# Patient Record
Sex: FEMALE | Race: OTHER
[De-identification: names, ages, dates, MRNs, and addresses within clinical notes are randomized per-mention and may not be internally consistent; named-entity substitution may affect disease eponyms.]

---

## 2018-06-02 ENCOUNTER — HOSPITAL ENCOUNTER (INPATIENT)
Dept: HOSPITAL 17 - PHED | Age: 53
LOS: 4 days | Discharge: HOME | DRG: 65 | End: 2018-06-06
Attending: HOSPITALIST | Admitting: HOSPITALIST
Payer: SELF-PAY

## 2018-06-02 VITALS
DIASTOLIC BLOOD PRESSURE: 76 MMHG | HEART RATE: 68 BPM | OXYGEN SATURATION: 98 % | TEMPERATURE: 98.7 F | SYSTOLIC BLOOD PRESSURE: 152 MMHG | RESPIRATION RATE: 16 BRPM

## 2018-06-02 VITALS
HEART RATE: 71 BPM | RESPIRATION RATE: 16 BRPM | OXYGEN SATURATION: 100 % | SYSTOLIC BLOOD PRESSURE: 149 MMHG | DIASTOLIC BLOOD PRESSURE: 88 MMHG

## 2018-06-02 VITALS
RESPIRATION RATE: 16 BRPM | SYSTOLIC BLOOD PRESSURE: 162 MMHG | OXYGEN SATURATION: 98 % | HEART RATE: 82 BPM | DIASTOLIC BLOOD PRESSURE: 87 MMHG

## 2018-06-02 VITALS — BODY MASS INDEX: 19.04 KG/M2 | HEIGHT: 60 IN | WEIGHT: 97 LBS

## 2018-06-02 VITALS — DIASTOLIC BLOOD PRESSURE: 74 MMHG | HEART RATE: 71 BPM | SYSTOLIC BLOOD PRESSURE: 140 MMHG

## 2018-06-02 VITALS — HEART RATE: 60 BPM

## 2018-06-02 VITALS
TEMPERATURE: 99 F | HEART RATE: 68 BPM | DIASTOLIC BLOOD PRESSURE: 80 MMHG | RESPIRATION RATE: 20 BRPM | OXYGEN SATURATION: 100 % | SYSTOLIC BLOOD PRESSURE: 151 MMHG

## 2018-06-02 VITALS
HEART RATE: 72 BPM | OXYGEN SATURATION: 97 % | RESPIRATION RATE: 16 BRPM | DIASTOLIC BLOOD PRESSURE: 79 MMHG | SYSTOLIC BLOOD PRESSURE: 127 MMHG

## 2018-06-02 VITALS — OXYGEN SATURATION: 98 %

## 2018-06-02 DIAGNOSIS — I10: ICD-10-CM

## 2018-06-02 DIAGNOSIS — I61.8: Primary | ICD-10-CM

## 2018-06-02 DIAGNOSIS — R29.810: ICD-10-CM

## 2018-06-02 DIAGNOSIS — G93.89: ICD-10-CM

## 2018-06-02 DIAGNOSIS — D50.9: ICD-10-CM

## 2018-06-02 DIAGNOSIS — Z85.3: ICD-10-CM

## 2018-06-02 DIAGNOSIS — G81.94: ICD-10-CM

## 2018-06-02 DIAGNOSIS — K21.9: ICD-10-CM

## 2018-06-02 DIAGNOSIS — Z86.73: ICD-10-CM

## 2018-06-02 DIAGNOSIS — E78.5: ICD-10-CM

## 2018-06-02 LAB
ALBUMIN SERPL-MCNC: 4.5 GM/DL (ref 3.4–5)
ALP SERPL-CCNC: 65 U/L (ref 45–117)
ALT SERPL-CCNC: 46 U/L (ref 10–53)
AMORPHOUS SEDIMENT, URINE: (no result)
AST SERPL-CCNC: 45 U/L (ref 15–37)
BASOPHILS # BLD AUTO: 0.1 TH/MM3 (ref 0–0.2)
BASOPHILS NFR BLD: 0.9 % (ref 0–2)
BILIRUB SERPL-MCNC: 0.5 MG/DL (ref 0.2–1)
BUN SERPL-MCNC: 13 MG/DL (ref 7–18)
CALCIUM SERPL-MCNC: 9.1 MG/DL (ref 8.5–10.1)
CHLORIDE SERPL-SCNC: 105 MEQ/L (ref 98–107)
COLOR UR: (no result)
CREAT SERPL-MCNC: 0.91 MG/DL (ref 0.5–1)
EOSINOPHIL # BLD: 0.1 TH/MM3 (ref 0–0.4)
EOSINOPHIL NFR BLD: 1 % (ref 0–4)
ERYTHROCYTE [DISTWIDTH] IN BLOOD BY AUTOMATED COUNT: 15.3 % (ref 11.6–17.2)
FIBRINOGEN PPP-MCNC: 242 MG/DL (ref 227–377)
GFR SERPLBLD BASED ON 1.73 SQ M-ARVRAT: 64 ML/MIN (ref 89–?)
GLUCOSE SERPL-MCNC: 125 MG/DL (ref 74–106)
GLUCOSE UR STRIP-MCNC: (no result) MG/DL
HCO3 BLD-SCNC: 27.1 MEQ/L (ref 21–32)
HCT VFR BLD CALC: 38.4 % (ref 35–46)
HGB BLD-MCNC: 11.8 GM/DL (ref 11.6–15.3)
HGB UR QL STRIP: (no result)
INR PPP: 1 RATIO
KETONES UR STRIP-MCNC: (no result) MG/DL
LYMPHOCYTES # BLD AUTO: 2.6 TH/MM3 (ref 1–4.8)
LYMPHOCYTES NFR BLD AUTO: 36.5 % (ref 9–44)
MAGNESIUM SERPL-MCNC: 2.5 MG/DL (ref 1.5–2.5)
MCH RBC QN AUTO: 20.3 PG (ref 27–34)
MCHC RBC AUTO-ENTMCNC: 30.9 % (ref 32–36)
MCV RBC AUTO: 65.7 FL (ref 80–100)
MONOCYTE #: 0.3 TH/MM3 (ref 0–0.9)
MONOCYTES NFR BLD: 4.6 % (ref 0–8)
NEUTROPHILS # BLD AUTO: 4.1 TH/MM3 (ref 1.8–7.7)
NEUTROPHILS NFR BLD AUTO: 57 % (ref 16–70)
NITRITE UR QL STRIP: (no result)
PHOSPHATE SERPL-MCNC: 3.6 MG/DL (ref 2.5–4.9)
PLATELET # BLD: 282 TH/MM3 (ref 150–450)
PMV BLD AUTO: 10 FL (ref 7–11)
PROT SERPL-MCNC: 9 GM/DL (ref 6.4–8.2)
PROTHROMBIN TIME: 10.3 SEC (ref 9.8–11.6)
RBC # BLD AUTO: 5.84 MIL/MM3 (ref 4–5.3)
SODIUM SERPL-SCNC: 139 MEQ/L (ref 136–145)
SP GR UR STRIP: (no result) (ref 1–1.03)
SQUAMOUS #/AREA URNS HPF: (no result) /HPF (ref 0–5)
TROPONIN I SERPL-MCNC: (no result) NG/ML (ref 0.02–0.05)
URINE LEUKOCYTE ESTERASE: (no result)
WBC # BLD AUTO: 7.2 TH/MM3 (ref 4–11)

## 2018-06-02 PROCEDURE — 85730 THROMBOPLASTIN TIME PARTIAL: CPT

## 2018-06-02 PROCEDURE — 85384 FIBRINOGEN ACTIVITY: CPT

## 2018-06-02 PROCEDURE — 84100 ASSAY OF PHOSPHORUS: CPT

## 2018-06-02 PROCEDURE — 86900 BLOOD TYPING SEROLOGIC ABO: CPT

## 2018-06-02 PROCEDURE — 71045 X-RAY EXAM CHEST 1 VIEW: CPT

## 2018-06-02 PROCEDURE — 83550 IRON BINDING TEST: CPT

## 2018-06-02 PROCEDURE — 86922 COMPATIBILITY TEST ANTIGLOB: CPT

## 2018-06-02 PROCEDURE — 84443 ASSAY THYROID STIM HORMONE: CPT

## 2018-06-02 PROCEDURE — 86870 RBC ANTIBODY IDENTIFICATION: CPT

## 2018-06-02 PROCEDURE — 70498 CT ANGIOGRAPHY NECK: CPT

## 2018-06-02 PROCEDURE — 82728 ASSAY OF FERRITIN: CPT

## 2018-06-02 PROCEDURE — 85044 MANUAL RETICULOCYTE COUNT: CPT

## 2018-06-02 PROCEDURE — 87641 MR-STAPH DNA AMP PROBE: CPT

## 2018-06-02 PROCEDURE — 83540 ASSAY OF IRON: CPT

## 2018-06-02 PROCEDURE — 70496 CT ANGIOGRAPHY HEAD: CPT

## 2018-06-02 PROCEDURE — 82948 REAGENT STRIP/BLOOD GLUCOSE: CPT

## 2018-06-02 PROCEDURE — 86077 PHYS BLOOD BANK SERV XMATCH: CPT

## 2018-06-02 PROCEDURE — 99291 CRITICAL CARE FIRST HOUR: CPT

## 2018-06-02 PROCEDURE — 82550 ASSAY OF CK (CPK): CPT

## 2018-06-02 PROCEDURE — 80053 COMPREHEN METABOLIC PANEL: CPT

## 2018-06-02 PROCEDURE — 70450 CT HEAD/BRAIN W/O DYE: CPT

## 2018-06-02 PROCEDURE — 86920 COMPATIBILITY TEST SPIN: CPT

## 2018-06-02 PROCEDURE — 84466 ASSAY OF TRANSFERRIN: CPT

## 2018-06-02 PROCEDURE — 80307 DRUG TEST PRSMV CHEM ANLYZR: CPT

## 2018-06-02 PROCEDURE — 86850 RBC ANTIBODY SCREEN: CPT

## 2018-06-02 PROCEDURE — 93005 ELECTROCARDIOGRAM TRACING: CPT

## 2018-06-02 PROCEDURE — 81001 URINALYSIS AUTO W/SCOPE: CPT

## 2018-06-02 PROCEDURE — 85025 COMPLETE CBC W/AUTO DIFF WBC: CPT

## 2018-06-02 PROCEDURE — 85610 PROTHROMBIN TIME: CPT

## 2018-06-02 PROCEDURE — 83735 ASSAY OF MAGNESIUM: CPT

## 2018-06-02 PROCEDURE — 94150 VITAL CAPACITY TEST: CPT

## 2018-06-02 PROCEDURE — 84484 ASSAY OF TROPONIN QUANT: CPT

## 2018-06-02 PROCEDURE — 86901 BLOOD TYPING SEROLOGIC RH(D): CPT

## 2018-06-02 RX ADMIN — Medication SCH ML: at 20:10

## 2018-06-02 RX ADMIN — STANDARDIZED SENNA CONCENTRATE AND DOCUSATE SODIUM SCH TAB: 8.6; 5 TABLET, FILM COATED ORAL at 20:10

## 2018-06-02 RX ADMIN — LEVETIRACETAM SCH MLS/HR: 10 INJECTION INTRAVENOUS at 20:10

## 2018-06-02 RX ADMIN — ATORVASTATIN CALCIUM SCH MG: 40 TABLET, FILM COATED ORAL at 20:10

## 2018-06-02 RX ADMIN — PHENYTOIN SODIUM SCH MLS/HR: 50 INJECTION INTRAMUSCULAR; INTRAVENOUS at 16:20

## 2018-06-02 NOTE — HHI.HP
[facility]


Service


Critical Care Medicine


Primary Care Physician


Unknown


Admission Diagnosis





Intracranial bleed


Diagnosis:  


Travel History


International Travel<30 Days:  No


Contact w/Intl Traveler <30 Da:  No


Traveled to Known Affected Are:  No


History of Present Illness


53-year-old female presents with a new onset of left-sided weakness and 

numbness.  She has no other concurrent complaints at this time.  She has had 2 

prior strokes but is not on any blood thinners because she has had prior 

bleeding in the brain.  She denies any modifying factors.  CT head obtained in 

the emergency department showed acute right thalamic bleed with no measurable 

midline shift.





Review of Systems


Constitutional:  DENIES: Diaphoretic episodes, Fatigue, Fever, Weight gain, 

Weight loss, Chills, Dizziness, Change in appetite, Night Sweats


Endocrine:  DENIES: Abnorml menstrual pattern, Heat/cold intolerance, Polydipsia

, Polyuria, Polyphagia


Eyes:  DENIES: Blurred vision, Diplopia, Eye inflammation, Eye pain, Vision loss

, Photosensitivity, Double Vision


Ears, nose, mouth, throat:  DENIES: Tinnitus, Hearing loss, Vertigo, Nasal 

discharge, Oral lesions, Throat pain, Hoarseness, Ear Pain, Running Nose, 

Epistaxis, Sinus Pain, Toothache, Odynophagia


Respiratory:  DENIES: Apneas, Cough, Snoring, Wheezing, Hemoptysis, Sputum 

production, Shortness of breath


Cardiovascular:  DENIES: Chest pain, Palpitations, Syncope, Dyspnea on Exertion

, PND, Lower Extremity Edema, Orthopnea, Claudication


Gastrointestinal:  DENIES: Abdominal pain, Black stools, Bloody stools, 

Constipation, Diarrhea, Nausea, Vomiting, Difficulty Swallowing, Anorexia


Genitourinary:  DENIES: Abnormal vaginal bleeding, Dysmenorrhea, Dyspareunia, 

Sexual dysfunction, Urinary frequency, Urinary incontinence, Urgency, Hematuria

, Dysuria, Nocturia, Vaginal discharge


Musculoskeletal:  DENIES: Joint pain, Muscle aches, Stiffness, Joint Swelling, 

Back pain, Neck pain


Integumentary:  DENIES: Abnormal pigmentation, Pruritus, Rash, Nail changes, 

Breast masses, Breast skin changes, Nipple discharge


Hematologic/lymphatic:  DENIES: Bruising, Lymphadenopathy


Immunologic/allergic:  DENIES: Eczema, Urticaria


Neurologic:  COMPLAINS OF: Abnormal gait, Localized weakness, Poor Balance, 

DENIES: Headache, Paresthesias, Seizures, Speech Problems, Tremor


Psychiatric:  DENIES: Anxiety, Confusion, Mood changes, Depression, 

Hallucinations, Agitation, Suicidal Ideation, Homicidal Ideation, Delusions





Past Family Social History


Allergies:  


Coded Allergies:  


     No Known Allergies (Unverified , 18)


Past Medical History


Breast cancer in remission 


Hypertension  


GERD


Cerebral aneurysm 


High cholesterol


Past Surgical History


Craniotomy 


Left breast lumpectomy.


Reported Medications





Reported Meds & Active Scripts


Active


Reported


Zantac (Ranitidine HCl) 150 Mg Tab 150 Mg PO DAILY


Lisinopril 10 Mg Tab 10 Mg PO DAILY


Lipitor (Atorvastatin Calcium) 40 Mg Tab 40 Mg PO HS


Active Ordered Medications





Current Medications








 Medications


  (Trade)  Dose


 Ordered  Sig/Tyson


 Route


 PRN Reason  Start Time


 Stop Time Status Last Admin


Dose Admin


 


 Sodium Chloride  1,000 ml @ 


 84 mls/hr  [id number]


 IV


   18 15:54


    18 16:20


 


 


 Sodium Chloride


  (NS Flush)  2 ml  UNSCH  PRN


 IV FLUSH


 FLUSH AFTER USING IV ACCESS  18 16:00


     


 


 


 Sodium Chloride


  (NS Flush)  2 ml  BID


 IV FLUSH


   18 21:00


    18 20:10


 


 


 Acetaminophen


  (Tylenol)  650 mg  Q6H  PRN


 PO


 FEVER >101F  18 16:00


     


 


 


 Acetaminophen/


 Hydrocodone Bitart


  (Norco  5-325 Mg)  1 tab  Q4H  PRN


 PO


 PAIN SCALE 1 TO 5  18 16:00


     


 


 


 Morphine Sulfate


  (Morphine Inj)  2 mg  Q2H  PRN


 IV PUSH


 PAIN SCALE 6 TO 10  18 16:00


     


 


 


 Pantoprazole


 Sodium


  (Protonix Inj)  40 mg  DAILY


 IV PUSH


   6/3/18 09:00


     


 


 


 Ondansetron HCl


  (Zofran Inj)  4 mg  Q6H  PRN


 IV PUSH


 NAUSEA OR VOMITING  18 16:00


     


 


 


 Albuterol Sulfate


  (Albuterol Neb)  2.5 mg  Q2HR NEB  PRN


 INH


 SOB/WHEEZING  18 16:00


     


 


 


 Miscellaneous


 Information


  (Misc Nursing


 Information)  1  [id number]


 XX


   18 16:00


     


 


 


 Chlorhexidine


 Gluconate


  (Chlorhexidine


 2% Cloth)  3 pack


 Taper  DAILY@04


 TOP


   6/3/18 04:00


 19 03:59   


 


 


 Chlorhexidine


 Gluconate


  (Chlorhexidine


 2% Cloth)  3 pack  UNSCH  PRN


 [facility] [place]


 HYGIENIC CARE  18 16:00


     


 


 


 Senna/Docusate


 Sodium


  (Jaci-Colace)  1 tab  BID


 PO


   18 21:00


    18 20:10


 


 


 Magnesium


 Hydroxide


  (Milk Of


 Magnesia Liq)  30 ml  Q12H  PRN


 PO


 Mild constipation  18 16:00


     


 


 


 Sennosides


  (Senokot)  17.2 mg  Q12H  PRN


 PO


 Moderate constipation  18 16:00


     


 


 


 Bisacodyl


  (Dulcolax Supp)  10 mg  DAILY  PRN


 RECTAL


 SEVERE CONSITIPATION  18 16:00


     


 


 


 Lactulose


  (Lactulose Liq)  30 ml  DAILY  PRN


 PO


 SEVERE CONSITIPATION  18 16:00


     


 


 


 Labetalol HCl


  (Trandate Inj)  5 mg  Q1HR  PRN


 IV PUSH


 SBP>150, DBP>90, HR>65  18 16:00


     


 


 


 Nicardipine HCl


 25 mg/Sodium


 Chloride  260 ml @ 


 52 mls/hr  TITRATE  PRN


 IV


 Blood pressure management  18 16:00


     


 


 


 Atorvastatin


 Calcium


  (Lipitor)  40 mg  HS


 PO


   18 21:00


    18 20:10


 


 


 Lisinopril


  (Prinivil)  10 mg  DAILY


 PO


   6/3/18 09:00


     


 


 


 Enalaprilat


  (Vasotec Inj)  2.5 mg  Q6H  PRN


 IV PUSH


 SBP>150, DBP>90  18 16:00


    18 20:10


 


 


 Levetriacetam  100 ml @ 


 400 mls/hr  Q12HR


 IV


   18 21:00


 18 20:59  18 20:10


 








Family History


No family history of ICH


Social History


She denies use of alcohol, tobacco or substance abuse





Physical Exam


Vital Signs





Vital Signs








  Date Time  Temp Pulse Resp B/P (MAP) Pulse Ox O2 Delivery O2 Flow Rate FiO2


 


18 22:00  60      


 


18 20:00 99.0 70 20 151/80 (103) 100   


 


18 20:00  68      


 


18 19:00     100 Room Air  


 


18 18:12        


 


18 18:04  71  140/74 (96)    


 


18 17:44      Room Air  21


 


18 17:44  71 16 149/88 (108) 100 Room Air  


 


18 16:49  72 16 127/79 (95) 97 Room Air  


 


18 15:38 98.7 68 16 152/76 (101) 98 Room Air  


 


18 15:33     98 Room Air  


 


18 15:10     98   21


 


18 15:09      Room Air  


 


18 15:09     98 Room Air  


 


18 14:55  82 16 162/87 (112) 98 Room Air  








Physical Exam


GENERAL: Well-nourished, well-developed patient.


SKIN: Warm and dry.


HEAD: Normocephalic.


EYES: No scleral icterus. No injection or drainage. 


NECK: Supple, trachea midline. No JVD or lymphadenopathy.


CARDIOVASCULAR: Regular rate and rhythm without murmurs, gallops, or rubs. 


RESPIRATORY: Breath sounds equal bilaterally. No accessory muscle use.


GASTROINTESTINAL: Abdomen soft, non-tender, nondistended. 


MUSCULOSKELETAL: No cyanosis, or edema. 


BACK: Nontender without obvious deformity. 


NEURO EXAM:


GCS: 15


Mental Status: The patient is alert and oriented to person, place, and time 

with normal speech. 


Cranial Nerves: Visual acuity intact bilaterally. Visual fields normal in all 

quadrants. Pupils are round, reactive to light. Extraocular movements are 

intact without ptosis. Hearing is normal bilaterally. Voice is normal. Tongue 

protrudes midline and moves symmetrically. Mild asymmetry of the left face. 

Motor exam is 5+/5,  except on the left there is a mild pronator drift on the 

left side and the left leg has some very mild weakness. Sensory exam is intact 

to touch.  Deep tendon reflexes are trace at all sites.


Laboratory





Laboratory Tests








Test


  18


14:55 18


16:30


 


White Blood Count 7.2  


 


Red Blood Count 5.84  


 


Hemoglobin 11.8  


 


Hematocrit 38.4  


 


Mean Corpuscular Volume 65.7  


 


Mean Corpuscular Hemoglobin 20.3  


 


Mean Corpuscular Hemoglobin


Concent 30.9 


  


 


 


Red Cell Distribution Width 15.3  


 


Platelet Count 282  


 


Mean Platelet Volume 10.0  


 


Neutrophils (%) (Auto) 57.0  


 


Lymphocytes (%) (Auto) 36.5  


 


Monocytes (%) (Auto) 4.6  


 


Eosinophils (%) (Auto) 1.0  


 


Basophils (%) (Auto) 0.9  


 


Neutrophils # (Auto) 4.1  


 


Lymphocytes # (Auto) 2.6  


 


Monocytes # (Auto) 0.3  


 


Eosinophils # (Auto) 0.1  


 


Basophils # (Auto) 0.1  


 


CBC Comment AUTO DIFF  


 


Differential Comment


  AUTO DIFF


CONFIRMED 


 


 


Prothrombin Time 10.3  


 


Prothromb Time International


Ratio 1.0 


  


 


 


Activated Partial


Thromboplast Time 23.8 


  


 


 


Fibrinogen 242  


 


Blood Urea Nitrogen 13  


 


Creatinine 0.91  


 


Random Glucose 125  


 


Total Protein 9.0  


 


Albumin 4.5  


 


Calcium Level 9.1  


 


Alkaline Phosphatase 65  


 


Aspartate Amino Transf


(AST/SGOT) 45 


  


 


 


Alanine Aminotransferase


(ALT/SGPT) 46 


  


 


 


Total Bilirubin 0.5  


 


Sodium Level 139  


 


Potassium Level 4.8  


 


Chloride Level 105  


 


Carbon Dioxide Level 27.1  


 


Anion Gap 7  


 


Estimat Glomerular Filtration


Rate 64 


  


 


 


Phosphorus Level 3.6  


 


Magnesium Level 2.5  


 


Total Creatine Kinase 143  


 


Troponin I LESS THAN 0.02  


 


Thyroid Stimulating Hormone


3rd Gen 1.640 


  


 


 


Urine Collection Type  CLEAN CATCH 


 


Urine Color  STRAW 


 


Urine Turbidity  CLEAR 


 


Urine pH  6.5 


 


Urine Specific Gravity


  


  LESS/EQUAL


1.005


 


Urine Protein  NEG 


 


Urine Glucose (UA)  NEG 


 


Urine Ketones  NEG 


 


Urine Occult Blood  NEG 


 


Urine Nitrite  NEG 


 


Urine Bilirubin  NEG 


 


Urine Urobilinogen  0.2 


 


Urine Leukocyte Esterase  NEG 


 


Urine Squamous Epithelial


Cells 


  0-5 


 


 


Urine Amorphous Sediment  MOD 


 


Urine Collection Time  1630 


 


Urine Opiates Screen  NEG 


 


Urine Barbiturates Screen  NEG 


 


Urine Amphetamines Screen  NEG 


 


Urine Benzodiazepines Screen  NEG 


 


Urine Cocaine Screen  NEG 


 


Urine Cannabinoids Screen  NEG 








Result Diagram:  


18 1455                                                                    

            18 1455





Imaging





Last 24 hours Impressions








Neck CTA 18 1456 Signed





Impressions: 





 CONCLUSION:





 1.  Carotid CTA within normal limits.





 2.  Mildly masslike pleural thickening and parenchymal consolidation of the lef





 t lung apex. Please see above.





  





 


 


Head CTA 18 1456 Signed





Impressions: 





 CONCLUSION:





 1.  No evidence of significant stenosis or occlusion.





  





 





 The findings were discussed with Dr. Sharp at 4:40 PM on 2018.





  





 





  





 


 


Head CT 18 0000 Signed





Impressions: 





 CONCLUSION: Acute right thalamic bleed. No measurable midline shift. No other a





 cute intracranial abnormality demonstrated. Report called to Dr. Sharp at 3:12 P





 M.





  





 


 


Chest X-Ray 18 0000 Signed





Impressions: 





 CONCLUSION: 





 No evidence of acute cardiopulmonary disease.





  





 











Caprini VTE Risk Assessment


Caprini VTE Risk Assessment:  Mod/High Risk (score >= 2)


VTE Pharm Contraindication:  Hemorrhage


Caprini Risk Assessment Model











 Point Value = 1          Point Value = 2  Point Value = 3  Point Value = 5


 


Age 41-60


Minor surgery


BMI > 25 kg/m2


Swollen legs


Varicose veins


Pregnancy or postpartum


History of unexplained or recurrent


   spontaneous 


Oral contraceptives or hormone


   replacement


Sepsis (< 1 month)


Serious lung disease, including


   pneumonia (< 1 month)


Abnormal pulmonary function


Acute myocardial infarction


Congestive heart failure (< 1 month)


History of inflammatory bowel disease


Medical patient at bed rest Age 61-74


Arthroscopic surgery


Major open surgery (> 45 min)


Laparoscopic surgery (> 45 min)


Malignancy


Confined to bed (> 72 hours)


Immobilizing plaster cast


Central venous access Age >= 75


History of VTE


Family history of VTE


Factor V Leiden


Prothrombin 20748G


Lupus anticoagulant


Anticardiolipin antibodies


Elevated serum homocysteine


Heparin-induced thrombocytopenia


Other congenital or acquired


   thrombophilia Stroke (< 1 month)


Elective arthroplasty


Hip, pelvis, or leg fracture


Acute spinal cord injury (< 1 month)








Prophylaxis Regimen











   Total Risk


Factor Score Risk Level Prophylaxis Regimen


 


0-1      Low Early ambulation


 


2 Moderate Order ONE of the following:


*Sequential Compression Device (SCD)


*Heparin 5000 units SQ BID


 


3-4 Higher Order ONE of the following medications:


*Heparin 5000 units SQ TID


*Enoxaparin/Lovenox 40 mg SQ daily (WT < 150 kg, CrCl > 30 mL/min)


*Enoxaparin/Lovenox 30 mg SQ daily (WT < 150 kg, CrCl > 10-29 mL/min)


*Enoxaparin/Lovenox 30 mg SQ BID (WT < 150 kg, CrCl > 30 mL/min)


AND/OR


*Sequential Compression Device (SCD)


 


5 or more Highest Order ONE of the following medications:


*Heparin 5000 units SQ TID (Preferred with Epidurals)


*Enoxaparin/Lovenox 40 mg SQ daily (WT < 150 kg, CrCl > 30 mL/min)


*Enoxaparin/Lovenox 30 mg SQ daily (WT < 150 kg, CrCl > 10-29 mL/min)


*Enoxaparin/Lovenox 30 mg SQ BID (WT < 150 kg, CrCl > 30 mL/min)


AND


*Sequential Compression Device (SCD)











Assessment and Plan


Assessment and Plan


Thalamic bleed


-No surgical intervention indicated


-Neurosurgery consultation appreciated


-Blood pressure control to keep his BP less than 140


-Repeat CT a.m.


-Monitor coags


-PT and OT eval and treat as tolerated


-Further management per neurosurgery


-Keppra seizure prophylaxis





Hypertension


-Nicardipine drip to keep his BP less than 140


-Lisinopril 





GERD


-Pantoprazole





Dyslipidemia


-Atorvastatin 





DVT GI prophylaxis


-Ezra's and SCDs


-No pharmacological DVT prophylaxis due to acute ICH


-Pantoprazole





Critical Care:


The total critical care time was 35 minutes. Time to perform other separately 

billable procedures was not included in the critical care time.











Danilo Zhang MD 2018 23:44

## 2018-06-02 NOTE — PD
HPI


Chief Complaint:  Stroke Alert


Time Seen by Provider:  14:56


Travel History


International Travel<30 days:  No


Contact w/Intl Traveler<30days:  No


Traveled to known affect area:  No





History of Present Illness


HPI


52 y/o female presents with five-minute onset of left-sided weakness and 

numbness.  She states that she has no other concurrent complaints at this time.

  She states she has had 2 prior strokes but is not on any blood thinners.  

When I asked her why she states she has had prior bleeding on the brain.  She 

denies any modifying factors.  Quality is tingly.  Severity is entire left 

side.  Duration is 5 minutes.





PFSH


Past Medical History


Cancer:  Yes (BREAST RESOLVED)


High Cholesterol:  Yes


Cerebrovascular Accident:  Yes


GERD:  Yes


Hypertension:  Yes


Tetanus Vaccination:  < 5 Years


Influenza Vaccination:  Yes


Pregnant?:  Not Pregnant





Past Surgical History


Neurologic Surgery:  Yes (NEURO SX ANEURYSM CLIPS IN BRAIN)


Other Surgery:  Yes (LEFT BREAST LUMPECTOMY)





Social History


Alcohol Use:  No


Tobacco Use:  No


Substance Use:  No





Allergies-Medications


(Allergen,Severity, Reaction):  


Coded Allergies:  


     No Known Allergies (Unverified , 6/2/18)


Reported Meds & Prescriptions





Reported Meds & Active Scripts


Active


Reported


Zantac (Ranitidine HCl) 150 Mg Tab 150 Mg PO DAILY


Lisinopril 10 Mg Tab 10 Mg PO DAILY


Lipitor (Atorvastatin Calcium) 40 Mg Tab 40 Mg PO HS








Review of Systems


Except as stated in HPI:  all other systems reviewed are Neg





Physical Exam


Exam Limitations:  Other: (Acute weakness)


Narrative


GENERAL: 53-year-old female in no apparent distress


SKIN: Focused skin assessment warm/dry.


HEAD: Atraumatic. Normocephalic. 


EYES: Pupils equal and round. No scleral icterus. No injection or drainage. 


ENT: No nasal bleeding or discharge.  Mucous membranes pink and moist.


NECK: Trachea midline. 


CARDIOVASCULAR: Regular rate and rhythm.  


RESPIRATORY: No accessory muscle use. Clear to auscultation. Breath sounds 

equal bilaterally. 


GASTROINTESTINAL: Abdomen soft, non-tender, nondistended. 


MUSCULOSKELETAL: No obvious deformities. No clubbing.  No cyanosis.


NEUROLOGICAL: Awake and alert.  Decreased grasp on the left, pronator drift on 

the left, left leg drops after 1 second, normal speech.  Patient states she 

feels numb to the entire left side of her body and her left lower face





Data


Data


Last Documented VS





Vital Signs








  Date Time  Temp Pulse Resp B/P (MAP) Pulse Ox O2 Delivery O2 Flow Rate FiO2


 


6/2/18 15:38 98.7 68 16 152/76 (101) 98 Room Air  


 


6/2/18 15:10        21








Orders





 Orders


Cath For Specimen (6/2/18 14:56)


Neuro Checks Q2HX12,Q4H (6/2/18 14:56)


Nursing Bedside Swallow Assess .ONCE (6/2/18 14:56)


Activity Bed Rest (6/2/18 14:56)


Diet Npo (6/2/18 Dinner)


Prothrombin Time / Inr (Pt) (6/2/18 14:56)


Act Partial Throm Time (Ptt) (6/2/18 14:56)


Complete Blood Count With Diff (6/2/18 14:56)


Fibrinogen (6/2/18 14:56)


Creatine Kinase (Cpk) (6/2/18 14:56)


Troponin I (6/2/18 14:56)


Ua Includes Microscopic (6/2/18 14:56)


Drug Screen, Random Urine (6/2/18 14:56)


Type And Screen (6/2/18 14:56)


Ct Brain W/O Iv Contrast(Rout) (6/2/18 )


Cta Brain W Iv Contrast W 3d (6/2/18 14:56)


Cta Neck W Iv Contrast W 3d (6/2/18 14:56)


Chest, Single Ap (6/2/18 )


Electrocardiogram (6/2/18 )


Consult Neurology (6/2/18 14:56)


Blood Glucose (6/2/18 14:56)


Ecg Monitoring (6/2/18 14:56)


Iv Access Insert/Monitor (6/2/18 14:56)


NPO (6/2/18 14:56)


Oximetry (6/2/18 14:56)


Resp Oxygen Nc Stroke (6/2/18 )


Comprehensive Metabolic Panel (6/2/18 14:56)


Iodixanol 320 Inj (Rad Ct) (Visipaque 32 (6/2/18 15:30)


Admit Order (Ed Use Only) (6/2/18 15:45)





Labs





Laboratory Tests








Test


  6/2/18


14:55


 


White Blood Count 7.2 TH/MM3 


 


Red Blood Count 5.84 MIL/MM3 


 


Hemoglobin 11.8 GM/DL 


 


Hematocrit 38.4 % 


 


Mean Corpuscular Volume 65.7 FL 


 


Mean Corpuscular Hemoglobin 20.3 PG 


 


Mean Corpuscular Hemoglobin


Concent 30.9 % 


 


 


Red Cell Distribution Width 15.3 % 


 


Platelet Count 282 TH/MM3 


 


Mean Platelet Volume 10.0 FL 


 


Neutrophils (%) (Auto) 57.0 % 


 


Lymphocytes (%) (Auto) 36.5 % 


 


Monocytes (%) (Auto) 4.6 % 


 


Eosinophils (%) (Auto) 1.0 % 


 


Basophils (%) (Auto) 0.9 % 


 


Neutrophils # (Auto) 4.1 TH/MM3 


 


Lymphocytes # (Auto) 2.6 TH/MM3 


 


Monocytes # (Auto) 0.3 TH/MM3 


 


Eosinophils # (Auto) 0.1 TH/MM3 


 


Basophils # (Auto) 0.1 TH/MM3 


 


CBC Comment AUTO DIFF 


 


Differential Comment


  AUTO DIFF


CONFIRMED


 


Prothrombin Time 10.3 SEC 


 


Prothromb Time International


Ratio 1.0 RATIO 


 


 


Activated Partial


Thromboplast Time 23.8 SEC 


 


 


Fibrinogen 242 mg/dL 


 


Blood Urea Nitrogen 13 MG/DL 


 


Creatinine 0.91 MG/DL 


 


Random Glucose 125 MG/DL 


 


Total Protein 9.0 GM/DL 


 


Albumin 4.5 GM/DL 


 


Calcium Level 9.1 MG/DL 


 


Alkaline Phosphatase 65 U/L 


 


Aspartate Amino Transf


(AST/SGOT) 45 U/L 


 


 


Alanine Aminotransferase


(ALT/SGPT) 46 U/L 


 


 


Total Bilirubin 0.5 MG/DL 


 


Sodium Level 139 MEQ/L 


 


Potassium Level 4.8 MEQ/L 


 


Chloride Level 105 MEQ/L 


 


Carbon Dioxide Level 27.1 MEQ/L 


 


Anion Gap 7 MEQ/L 


 


Estimat Glomerular Filtration


Rate 64 ML/MIN 


 


 


Phosphorus Level 3.6 MG/DL 


 


Magnesium Level 2.5 MG/DL 


 


Total Creatine Kinase 143 U/L 


 


Troponin I


  LESS THAN 0.02


NG/ML











[facility]


Medical Decision Making


Medical Screen Exam Complete:  Yes


Emergency Medical Condition:  Yes


Medical Record Reviewed:  Yes (Past history confirmed)


Interpretation(s)


CBC & BMP Diagram


6/2/18 14:55








Total Protein 9.0 H, Albumin 4.5, Calcium Level 9.1, Alkaline Phosphatase 65, 

Aspartate Amino Transf (AST/SGOT) 45 H, Alanine Aminotransferase (ALT/SGPT) 46, 

Total Bilirubin 0.5








Last 24 hours Impressions








Head CT 6/2/18 0000 Signed





Impressions: 





 CONCLUSION: Acute right thalamic bleed. No measurable midline shift. No other a





 cute intracranial abnormality demonstrated. Report called to Dr. Sharp at 3:12 P





 M.





  





 





cta prelim discussed with radiologist and no large aneurysm or clot or critical 

stenosis


Differential Diagnosis


Stroke, bleed, mass


Narrative Course


Given timeline stroke alert was initiated.  With history of prior intracranial 

hemorrhage she is not a candidate for TPA.  Saw large bleed on initial CT and 

discussed with neurosurgery.  Initial blood pressure is stable so no 

antihypertensive needed at this time.  Patient updated and agrees to admission.





on recheck no change in symptoms, will go to ICU for close monitoring


Critical Care Narrative


Aggregate critical care time was 35 minutes. Time to perform other separately 

billable procedures was not  


included in the critical care time. My time did not include minutes spent 

treating any other patients simultaneously or on  


activities that did not directly contribute to the patient's treatment.  





The services I provided to this patient were to treat and/or prevent clinically 

significant deterioration that could result  


in: Herniation, death





I provided critical care services requiring my management, as noted below:


Chart data review, documentation time, medication orders and management, vital 

sign assessments/reviewing monitor data,  


ordering and reviewing lab tests, ordering and interpreting/reviewing x-rays 

and diagnostic studies, care of the patient  


and discussion of the patient with the admitting physicians.





Physician Communication


Physician Communication


dr randolph will follow at the main


dr wellington agrees to admit





Diagnosis





 Primary Impression:  


 Thalamic hemorrhage





Admitting Information


Admitting Physician Requests:  Admit











Berenice Sharp MD Jun 2, 2018 16:05

## 2018-06-02 NOTE — RADRPT
EXAM DATE:  6/2/2018 3:07 PM EDT

AGE/SEX:        53 years / Female



INDICATIONS:  Stroke alert, left sided weakness today.



CLINICAL DATA:  This is the patient's initial encounter. Patient reports that signs and symptoms have
 been present for 1 day and indicates a pain score of Nonresponsive. 

                                                                          

MEDICAL/SURGICAL HISTORY:   Non-responsive. Non-responsive.



RADIATION DOSE:  52.41 CTDI (mGy)







COMPARISON:      No prior [place] exams available for comparison.



Report was called by [ ]

TECHNIQUE:  CT of the head without contrast.  Using automated exposure control and adjustment of the 
mA and/or kV according to patient size, radiation dose was kept as low as reasonably achievable to ob
tain optimal diagnostic quality images.



FINDINGS: Acute parenchymal hemorrhage seen in the region of the right thalamus. The bleed measures a
pproximately 2 cm in size. Minimal mass effect on surrounding parenchyma. No measurable midline shift
.



Previous aneurysm clipping of the right side of the [place] of [place]. No bleed or other acute abnorma
lity in this region.



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 No evidence of an acute ischemic event. Nothing hood
t looks like a mass either.



Previous right frontotemporal craniotomy. No acute skull abnormality demonstrated.



CONCLUSION: Acute right thalamic bleed. No measurable midline shift. No other acute intracranial abno
rmality demonstrated. Report called to Dr. Sharp at 3:12 PM.



Electronically signed by: Jose Maria Smith MD  6/2/2018 3:15 PM EDT

## 2018-06-02 NOTE — RADRPT
EXAM DATE:  6/2/2018 3:50 PM EDT

AGE/SEX:        53 years / Female



INDICATIONS:  Stroke alert.



CLINICAL DATA:  This is the patient's initial encounter. Patient reports that signs and symptoms have
 been present for 1 day and indicates a pain score of 0/10. 

                                                                          

MEDICAL/SURGICAL HISTORY:       None. None.



COMPARISON:      No prior [place] exams available for comparison.



FINDINGS:  

A single AP view of the chest demonstrates the lungs to be symmetrically aerated without evidence of 
mass, infiltrate or effusion.  The cardiomediastinal contours are unremarkable.  Osseous structures a
re intact.  





CONCLUSION: 

No evidence of acute cardiopulmonary disease.



Electronically signed by: Jose Maria Smith MD  6/2/2018 4:07 PM EDT

## 2018-06-02 NOTE — RADRPT
EXAM DATE:  6/2/2018 3:34 PM EDT

AGE/SEX:        53 years / Female



INDICATIONS:  Stroke alert, left sided weakness today.



CLINICAL DATA:  This is the patient's initial encounter. Patient reports that signs and symptoms have
 been present for 1 day and indicates a pain score of Nonresponsive. 

                                                                          

MEDICAL/SURGICAL HISTORY:   Non-responsive. Non-responsive.



RADIATION DOSE:  42.24 CTDI (mGy) ; Combined studies









COMPARISON:      No prior [place] exams available for comparison.



TECHNIQUE:  Volumetric scanning was performed using a multirow detector CT scanner during bolus infus
ion of 97 ml Visipaque 320 (iodixanol)  nonionic water-soluble contrast as a cumulative dose for mult
iple exams.   The data was postprocessed with a variety of visualization algorithms including full-vo
lume maximum intensity projection, multiplanar sliding thin-slab reformation, curved-planar reformati
on, and surface-rendering techniques.  Using automated exposure control and adjustment of the mA and/
or kV according to patient size, radiation dose was kept as low as reasonably achievable to obtain op
timal diagnostic quality images.



Elevated flow velocities and ICA/CCA ratios have been found to correlate with increased degrees of ve
ssel stenosis, calculated as percentage of diameter relative to a normal segment of distal ICA/CCA.



FINDINGS:  

Aortic Arch:  There is a three-vessel origin of the great vessels from the aorta.  No evidence of ost
ial narrowing

Right Carotid:  The common carotid artery is intact.  The carotid bulb has a normal configuration wit
hout ulceration or narrowing.  The internal carotid artery lumen is smooth without stenosis.  The ext
ernal carotid artery is intact.

Left Carotid:  The common carotid artery is intact.  The carotid bulb has a normal configuration with
out ulceration or narrowing.  The internal carotid artery lumen is smooth without stenosis.  The exte
rnal carotid artery is intact.

Vertebrals:  The vertebral arteries have a symmetric diameter.  No stenotic lesions are seen.



There is pleural thickening and adjacent parenchymal consolidation of the visualized left lung apex, 
presumably scarring but a 3 month follow-up noncontrast chest CT is suggested.





CONCLUSION:

1.  Carotid CTA within normal limits.

2.  Mildly masslike pleural thickening and parenchymal consolidation of the left lung apex. Please se
e above.



Electronically signed by: Jose Maria Smith MD  6/2/2018 5:04 PM EDT

## 2018-06-02 NOTE — MB
cc:

Wilner CAST Jorge

****

 

 

DATE:

06/02/2018

 

 

CHIEF COMPLAINT:

Transfer from [place].

 

HISTORY OF PRESENT ILLNESS:

This is a 53-year-old female patient with previous history of 

craniotomy for aneurysm clipping.  The patient reports that she had 

been doing well, when around 2 o'clock she noted significant weakness 

of the left side.  At that time, she denies having any headaches or 

any nausea or vomiting.  She went to [place] for evaluation.  CT 

scan was performed, which was abnormal and, because of this, the 

patient eventually was transferred.  The patient reports that 

presently she is doing well.  She feels that her weakness on the left 

side is improving.  She denies any numbness at the present time.  She 

denies any headaches or any problems with nausea and vomiting.

 

PAST MEDICAL HISTORY:

Remarkable for breast cancer resolved ,hypertension,  GERD, aneurysm 

clipping and high cholesterol.

 

PAST SURGICAL HISTORY:

Includes craniotomy and left breast lumpectomy.

 

SOCIAL HISTORY:

She denies use of alcohol, tobacco or substance abuse.

 

ALLERGIES:

SHE HAS NO ALLERGIES.

 

MEDICATIONS:

1.  Zantac.

2.  Lisinopril.

3.  Lipitor.

 

REVIEW OF SYSTEMS:

Pertaining to above history as noted.

 

PHYSICAL EXAMINATION:

VITAL SIGNS:  Blood pressure of 140/74, pulse of 71, temperature of 

98.7, pulse oximetry of 98 with a respiratory rate of 16.

GENERAL:  The patient is sitting in bed in no acute distress, 

well-developed, thin female, pleasant.

HEENT:  Unremarkable, except for evidence of previous craniotomy.

NECK:  Supple with good carotid pulses bilaterally.

CHEST:  Symmetric.

LUNGS:  Clear.

HEART:  Shows a regular rhythm with normal heart sounds.

ABDOMEN:  Soft and nontender.

EXTREMITIES:  Clear.

NEUROLOGIC:  The patient is alert and awake.  She is oriented x3.  She

follows commands well.  Speech is fluent.  She is pleasant.  Cranial 

nerves 2-12 are intact, except for a mild asymmetry of the left face. 

Motor exam is 5+/5,  except  there is a mild pronator drift

on the left side and the left leg has some very mild weakness.  

Sensory exam is intact to touch.  Deep tendon reflexes are trace at 

all sites.

 

IMAGING STUDIES:

Review of a CT scan of the brain shows evidence of a right thalamic 

hemorrhage with minimal mass effect.  There is also evidence of an 

aneurysm clip on the right and evidence of a previous craniotomy.  CTA

of the head showed no evidence of aneurysm or AVM.  CTA of the neck 

was unremarkable for stenosis.

 

OVERALL IMPRESSION:

Hypertensive intracranial hemorrhage thalamic.

 

RECOMMENDATIONS:

At this time, place the patient on neurological observation with vital

signs and neuro checks.  Maintain control of the blood pressure, 

preferably in the range of the 120s.  May need physical therapy, 

because of her left-sided weakness. Would keep on the clear liquids 

for tonight and then may advance diet in the morning if stable.  She 

should also have a repeat CT scan of the brain in the morning.

 

 

__________________________________

Wilner SIMPSON/

D: 06/02/2018, 08:01 PM

T: 06/02/2018, 08:35 PM

Visit #: [id number]

Job #: [id number]

MTDD

## 2018-06-02 NOTE — RADRPT
EXAM DATE:  6/2/2018 4:29 PM EDT

AGE/SEX:        53 years / Female



INDICATIONS:  Stroke alert, left sided weakness today.



CLINICAL DATA:  This is the patient's initial encounter. Patient reports that signs and symptoms have
 been present for 1 day and indicates a pain score of Nonresponsive. 

                                                                          

MEDICAL/SURGICAL HISTORY:   Non-responsive. Non-responsive.



RADIATION DOSE:  42.24 CTDI (mGy) ; Combined studies









COMPARISON:      CT of the brain dated 6/2/2018 is used for comparison



TECHNIQUE:  Volumetric scanning was performed using a multi-row detector CT scanner during bolus infu
celso of 97 ml Visipaque 320 (iodixanol)  nonionic water-soluble contrast as a cumulative dose for mul
tiple exams.   The data was post processed with a variety of visualization algorithms including full 
volume maximum intensity projection, multi-planar sliding thin slab reformation, curved planar reform
ation, and surface rendering techniques.  Using automated exposure control and adjustment of the mA a
nd/or kV according to patient size, radiation dose was kept as low as reasonably achievable to obtain
 optimal diagnostic quality images.



FINDINGS:  

There is excellent visualization of the major intracranial arteries out to the second-order branch ve
ssels.  There is no evidence for aneurysm, vessel truncation or stenosis, and no evidence for vascula
r malformation. Aneurysm clip is again noted in the expected region of the right A1 segment.



CONCLUSION:

1.  No evidence of significant stenosis or occlusion.



The findings were discussed with Dr. Sharp at 4:40 PM on 6/2/2018.





Electronically signed by: Jeffrey Jaramillo MD  6/2/2018 4:40 PM EDT

## 2018-06-03 VITALS
DIASTOLIC BLOOD PRESSURE: 79 MMHG | RESPIRATION RATE: 18 BRPM | SYSTOLIC BLOOD PRESSURE: 113 MMHG | TEMPERATURE: 98.5 F | OXYGEN SATURATION: 98 % | HEART RATE: 74 BPM

## 2018-06-03 VITALS
OXYGEN SATURATION: 100 % | DIASTOLIC BLOOD PRESSURE: 56 MMHG | SYSTOLIC BLOOD PRESSURE: 115 MMHG | HEART RATE: 69 BPM | RESPIRATION RATE: 27 BRPM | TEMPERATURE: 98.4 F

## 2018-06-03 VITALS — HEART RATE: 66 BPM

## 2018-06-03 VITALS
HEART RATE: 58 BPM | OXYGEN SATURATION: 100 % | RESPIRATION RATE: 13 BRPM | TEMPERATURE: 98.9 F | DIASTOLIC BLOOD PRESSURE: 72 MMHG | SYSTOLIC BLOOD PRESSURE: 131 MMHG

## 2018-06-03 VITALS
RESPIRATION RATE: 23 BRPM | HEART RATE: 67 BPM | SYSTOLIC BLOOD PRESSURE: 138 MMHG | TEMPERATURE: 99.1 F | OXYGEN SATURATION: 98 % | DIASTOLIC BLOOD PRESSURE: 81 MMHG

## 2018-06-03 VITALS
TEMPERATURE: 98.4 F | RESPIRATION RATE: 16 BRPM | SYSTOLIC BLOOD PRESSURE: 135 MMHG | OXYGEN SATURATION: 98 % | DIASTOLIC BLOOD PRESSURE: 88 MMHG | HEART RATE: 72 BPM

## 2018-06-03 VITALS
RESPIRATION RATE: 20 BRPM | HEART RATE: 63 BPM | TEMPERATURE: 98.6 F | DIASTOLIC BLOOD PRESSURE: 63 MMHG | SYSTOLIC BLOOD PRESSURE: 110 MMHG | OXYGEN SATURATION: 97 %

## 2018-06-03 VITALS — HEART RATE: 77 BPM

## 2018-06-03 VITALS — HEART RATE: 65 BPM

## 2018-06-03 VITALS — HEART RATE: 72 BPM

## 2018-06-03 VITALS — HEART RATE: 59 BPM

## 2018-06-03 VITALS — OXYGEN SATURATION: 99 %

## 2018-06-03 LAB
ALBUMIN SERPL-MCNC: 3.5 GM/DL (ref 3.4–5)
ALP SERPL-CCNC: 54 U/L (ref 45–117)
ALT SERPL-CCNC: 38 U/L (ref 10–53)
AST SERPL-CCNC: 24 U/L (ref 15–37)
BASOPHILS # BLD AUTO: 0 TH/MM3 (ref 0–0.2)
BASOPHILS NFR BLD: 0.6 % (ref 0–2)
BILIRUB SERPL-MCNC: 0.9 MG/DL (ref 0.2–1)
BUN SERPL-MCNC: 12 MG/DL (ref 7–18)
CALCIUM SERPL-MCNC: 8.3 MG/DL (ref 8.5–10.1)
CHLORIDE SERPL-SCNC: 110 MEQ/L (ref 98–107)
CREAT SERPL-MCNC: 0.74 MG/DL (ref 0.5–1)
EOSINOPHIL # BLD: 0.1 TH/MM3 (ref 0–0.4)
EOSINOPHIL NFR BLD: 1.3 % (ref 0–4)
ERYTHROCYTE [DISTWIDTH] IN BLOOD BY AUTOMATED COUNT: 15.8 % (ref 11.6–17.2)
GFR SERPLBLD BASED ON 1.73 SQ M-ARVRAT: 82 ML/MIN (ref 89–?)
GLUCOSE SERPL-MCNC: 85 MG/DL (ref 74–106)
HCO3 BLD-SCNC: 21.6 MEQ/L (ref 21–32)
HCT VFR BLD CALC: 32.5 % (ref 35–46)
HGB BLD-MCNC: 10.4 GM/DL (ref 11.6–15.3)
INR PPP: 1.1 RATIO
LYMPHOCYTES # BLD AUTO: 1.9 TH/MM3 (ref 1–4.8)
LYMPHOCYTES NFR BLD AUTO: 29 % (ref 9–44)
MAGNESIUM SERPL-MCNC: 2.3 MG/DL (ref 1.5–2.5)
MCH RBC QN AUTO: 20.5 PG (ref 27–34)
MCHC RBC AUTO-ENTMCNC: 32 % (ref 32–36)
MCV RBC AUTO: 63.9 FL (ref 80–100)
MONOCYTE #: 0.4 TH/MM3 (ref 0–0.9)
MONOCYTES NFR BLD: 5.5 % (ref 0–8)
NEUTROPHILS # BLD AUTO: 4.2 TH/MM3 (ref 1.8–7.7)
NEUTROPHILS NFR BLD AUTO: 63.6 % (ref 16–70)
PHOSPHATE SERPL-MCNC: 4 MG/DL (ref 2.5–4.9)
PLATELET # BLD: 205 TH/MM3 (ref 150–450)
PMV BLD AUTO: 8.9 FL (ref 7–11)
PROT SERPL-MCNC: 7.3 GM/DL (ref 6.4–8.2)
PROTHROMBIN TIME: 10.9 SEC (ref 9.8–11.6)
RBC # BLD AUTO: 5.08 MIL/MM3 (ref 4–5.3)
SODIUM SERPL-SCNC: 143 MEQ/L (ref 136–145)
WBC # BLD AUTO: 6.6 TH/MM3 (ref 4–11)

## 2018-06-03 RX ADMIN — Medication SCH ML: at 21:05

## 2018-06-03 RX ADMIN — ATORVASTATIN CALCIUM SCH MG: 40 TABLET, FILM COATED ORAL at 21:06

## 2018-06-03 RX ADMIN — Medication SCH ML: at 09:04

## 2018-06-03 RX ADMIN — LISINOPRIL SCH MG: 10 TABLET ORAL at 09:04

## 2018-06-03 RX ADMIN — STANDARDIZED SENNA CONCENTRATE AND DOCUSATE SODIUM SCH TAB: 8.6; 5 TABLET, FILM COATED ORAL at 09:04

## 2018-06-03 RX ADMIN — LEVETIRACETAM SCH MLS/HR: 10 INJECTION INTRAVENOUS at 21:05

## 2018-06-03 RX ADMIN — CHLORHEXIDINE GLUCONATE SCH PACK: 500 CLOTH TOPICAL at 03:53

## 2018-06-03 RX ADMIN — STANDARDIZED SENNA CONCENTRATE AND DOCUSATE SODIUM SCH TAB: 8.6; 5 TABLET, FILM COATED ORAL at 21:06

## 2018-06-03 RX ADMIN — LEVETIRACETAM SCH MLS/HR: 10 INJECTION INTRAVENOUS at 09:04

## 2018-06-03 RX ADMIN — PANTOPRAZOLE SCH MG: 40 TABLET, DELAYED RELEASE ORAL at 09:04

## 2018-06-03 RX ADMIN — PHENYTOIN SODIUM SCH MLS/HR: 50 INJECTION INTRAMUSCULAR; INTRAVENOUS at 04:38

## 2018-06-03 NOTE — EKG
Date Performed: 06/02/2018       Time Performed: 15:31:27

 

PTAGE:      53 years

 

EKG:      ECTOPIC ATRIAL RHYTHM ABNORMAL RHYTHM ECG 

 

NO PREVIOUS TRACING            

 

DOCTOR:   Juan Lopez  Interpretating Date/Time  06/03/2018 17:30:02

## 2018-06-03 NOTE — HHI.NSPN
__________________________________________________





History


Interval History


Patient offers no complaints





Exam


Results





Vital Signs








  Date Time  Temp Pulse Resp B/P (MAP) Pulse Ox O2 Delivery O2 Flow Rate FiO2


 


6/3/18 16:00  69      


 


6/3/18 16:00 98.4  27 115/56 (75) 100   


 


6/3/18 08:03        21


 


6/3/18 08:00      Room Air  














Intake and Output   


 


 6/3/18 6/3/18 6/4/18





 08:00 16:00 00:00


 


Intake Total 1000 ml  


 


Output Total 900 ml  


 


Balance 100 ml  








Physical Examination


Remains alert and awake.  Follows commands well.  Moves all extremities well.


Mild left hemiparesis.  Undergoing evaluation by PT


Lab, Micro, Other Results


CT head reviewed.  Hemorrhage appears stable.  No new changes





Medical Decision Making


Impression and Plan


Patient is stable and doing well


If blood pressure remains controlled may move to regular floor











Wilner Faust MD [place] 3, 2018 16:54

## 2018-06-03 NOTE — RADRPT
EXAM DATE:  6/3/2018 4:12 AM EDT

AGE/SEX:        53 years / Female



INDICATIONS:  Follow up hemorrhage.



CLINICAL DATA:  This is the patient's subsequent encounter. Patient reports that signs and symptoms h
ave been present for 1 day and indicates a pain score of 0/10. 

                                                                          

MEDICAL/SURGICAL HISTORY:   Aneurysm, intracranial.  Cerebrovascular disease.  Hypertension.  GERD  B
reast cancer  . Aneurysm clips



RADIATION DOSE:  36.32 CTDI (mGy)







COMPARISON:      [facility], CT BRAIN W/O CONTRAST, 6/2/2018.  .





TECHNIQUE:  CT of the head without contrast.  Using automated exposure control and adjustment of the 
mA and/or kV according to patient size, radiation dose was kept as low as reasonably achievable to ob
tain optimal diagnostic quality images.



FINDINGS: 

Cerebrum:  There is a persistent 2 cm hemorrhage at the posterior right thalamus. This is unchanged f
rom the prior exam. There is an aneurysm clip seen at the right suprasellar cistern region. There is 
encephalomalacia at the right temporal and parietal lobes. There is some dilatation of the temporal h
orn of the right lateral ventricle in response to the encephalomalacia. The ventricles are otherwise 
normal for age.  No evidence of midline shift. Calcifications are seen at the medial aspect of the ba
sal ganglia. 

Posterior Fossa:  The cerebellum and brainstem are intact.  The 4th ventricle is midline.  The cerebe
llopontine angle is unremarkable.

Extracranial:  The visualized portion of the orbits is intact.

Skull:  The patient is status post right frontotemporal craniotomy.



CONCLUSION:

1.  Persistent stable 2 cm hemorrhage at the right thalamus.

2.  Encephalomalacia involving portions of the right temporal and parietal lobes.

3.  Status post right frontotemporal craniotomy. There is an aneurysm clip seen at the right suprasel
lar cistern region.



Electronically signed by: Jose Maria Pollock MD  6/3/2018 5:21 AM EDT

## 2018-06-03 NOTE — HHI.CCPN
Subjective


Remarks/Hospital Course


53-year-old female presents with a new onset of left-sided weakness and 

numbness.  She has no other concurrent complaints at this time.  She has had 2 

prior strokes but is not on any blood thinners because she has had prior 

bleeding in the brain.  She denies any modifying factors.  CT head obtained in 

the emergency department showed acute right thalamic bleed with no measurable 

midline shift.





SUBJECTIVE:





6/3: Afebrile.  No acute issues overnight.  Currently resting in bed in no 

acute distress.  Repeat brain CT this a.m. reveals stable right thalamic 

hemorrhage.





Objective





Vital Signs








  Date Time  Temp Pulse Resp B/P (MAP) Pulse Ox O2 Delivery O2 Flow Rate FiO2


 


6/3/18 06:00  77      


 


6/3/18 04:00 98.4  16 135/88 (104) 98   


 


6/2/18 19:00      Room Air  


 


6/2/18 17:44        21














Intake and Output   


 


 6/3/18 6/3/18 6/4/18





 08:00 16:00 00:00


 


Intake Total 1000 ml  


 


Output Total 900 ml  


 


Balance 100 ml  








Result Diagram:  


6/3/18 0359                                                                    

            6/3/18 0359





Imaging





Last Impressions








Head CT 6/3/18 0600 Signed





Impressions: 





 CONCLUSION:





 1.  Persistent stable 2 cm hemorrhage at the right thalamus.





 2.  Encephalomalacia involving portions of the right temporal and parietal lobe





 s.





 3.  Status post right frontotemporal craniotomy. There is an aneurysm clip seen





  at the right suprasellar cistern region.





  





 


 


Neck CTA 6/2/18 1456 Signed





Impressions: 





 CONCLUSION:





 1.  Carotid CTA within normal limits.





 2.  Mildly masslike pleural thickening and parenchymal consolidation of the lef





 t lung apex. Please see above.





  





 


 


Head CTA 6/2/18 1456 Signed





Impressions: 





 CONCLUSION:





 1.  No evidence of significant stenosis or occlusion.





  





 





 The findings were discussed with Dr. Sharp at 4:40 PM on 6/2/2018.





  





 





  





 


 


Chest X-Ray 6/2/18 0000 Signed





Impressions: 





 CONCLUSION: 





 No evidence of acute cardiopulmonary disease.





  





 








Objective Remarks


GENERAL: 53-year-old female currently resting in bed in no acute distress


SKIN: Warm and dry.  Well perfused


HEAD: History of right frontoparietal craniotomy


EYES: Pulses are 2 mm bilaterally and reactive no scleral icterus. No injection 

or drainage. 


NECK: Supple, trachea midline. No JVD or lymphadenopathy.


CARDIOVASCULAR: RRR.  S1, S2.  No S4.  Without murmur


RESPIRATORY: Breath sounds equal bilaterally. No accessory muscle use.


GASTROINTESTINAL: Abdomen soft, non-tender, nondistended.  Bowel sounds are 

present


MUSCULOSKELETAL: No significant peripheral edema. 


NEURO EXAM: Slight left facial droop.  Pronator drift on left side.  Strength 

left lower extremity 4-5.  Right upper and lower extremity 5 out of 5.  Left 

upper extremity 5 out of 5.  Sensation intact.  Gait was not assessed.


Urinary Catheter:  No


Assessment to:  Continue


Vascular Central Line Catheter:  No


Assessment to:  Continue





A/P


Assessment and Plan


Neuro/Psych:





Acute right thalamic intracerebral hemorrhage


History of right frontoparietal craniotomy with history of aneurysmal 2 

clipping 1





CT brain 6/3 revealed stable tubes centimeter right thalamic intraparenchymal 

hemorrhage.  Old history of right frontoparietal craniotomy with aneurysmal 

clipping noted.


CTA brain/neck revealed no aneurysms


Evaluated by neurosurgery.  No intervention planned at this time


Levetiracetam 500 mg IV twice daily for 7 total days for seizure prophylaxis


Keep systolic blood pressure less than 140


Neurochecks


 


CV: 





Essential hypertension


Hyperlipidemia





Continue lisinopril 10 mg daily for essential hypertension


As needed labetalol and enalapril and nicardipine drip keep systolic blood 

pressure less than 140


Continue atorvastatin 40 mg daily for dyslipidemia


Discontinue normal saline at 84 cc an hour





Resp: 





Left lung apex pleural thickening





Nasal cannula to maintain saturations greater than or equal to 92%


Incentive spirometry while awake


Albuterol aerosols every 2 hours as needed dyspnea





Recommend follow-up CT thorax in 3 months to follow-up on left apex pleural 

thickening





GI: 





Gastroesophageal reflux disease





Advance diet per neurosurgery


Currently on pantoprazole 40 mg daily.  Switch to p.o.


On ranitidine 150 mg daily at home for GERD


Docusate sodium/senna 1 tablet twice daily for bowel regimen





:  





No indication for Monroy catheter





Endo:  





Sliding scale insulin if indicated to maintain euglycemia





Renal: 





Creatinine currently within normal limits


Monitor urine output


Accurate I's and O's





Heme:





Microcytic anemia





Monitor CBC daily.  Follow trend


Hemoccult stool


Iron studies/transferrin/ferritin/reticulocyte count and peripheral smear 

pending





ID:





Monitor for signs and symptomatology of infection





FEN:





Replace electrolytes as clinically indicated





MSK:





PT evaluate and treat





Access -utilize peripheral IV.  Central line if indicated





Prophylaxis


-GI -pantoprazole


-DVT -SCD/pharmacological prophylaxis when okay with neurosurgery





Level 2 follow-up.  Patient is stable from a critical care medicine standpoint.

  We will assign care to hospitalist in a.m. 6/4.











Luc Claudio MD [place] 3, 2018 06:35

## 2018-06-04 VITALS
DIASTOLIC BLOOD PRESSURE: 80 MMHG | OXYGEN SATURATION: 100 % | SYSTOLIC BLOOD PRESSURE: 144 MMHG | RESPIRATION RATE: 30 BRPM | TEMPERATURE: 98.7 F | HEART RATE: 80 BPM

## 2018-06-04 VITALS
SYSTOLIC BLOOD PRESSURE: 120 MMHG | TEMPERATURE: 98 F | OXYGEN SATURATION: 99 % | HEART RATE: 65 BPM | RESPIRATION RATE: 27 BRPM | DIASTOLIC BLOOD PRESSURE: 74 MMHG

## 2018-06-04 VITALS
RESPIRATION RATE: 22 BRPM | HEART RATE: 78 BPM | TEMPERATURE: 98.7 F | SYSTOLIC BLOOD PRESSURE: 127 MMHG | OXYGEN SATURATION: 98 % | DIASTOLIC BLOOD PRESSURE: 77 MMHG

## 2018-06-04 VITALS — HEART RATE: 68 BPM

## 2018-06-04 VITALS
DIASTOLIC BLOOD PRESSURE: 61 MMHG | HEART RATE: 66 BPM | TEMPERATURE: 98 F | SYSTOLIC BLOOD PRESSURE: 134 MMHG | OXYGEN SATURATION: 98 % | RESPIRATION RATE: 17 BRPM

## 2018-06-04 VITALS — HEART RATE: 65 BPM

## 2018-06-04 VITALS
HEART RATE: 71 BPM | OXYGEN SATURATION: 98 % | DIASTOLIC BLOOD PRESSURE: 73 MMHG | RESPIRATION RATE: 16 BRPM | SYSTOLIC BLOOD PRESSURE: 122 MMHG | TEMPERATURE: 98.2 F

## 2018-06-04 VITALS
HEART RATE: 80 BPM | DIASTOLIC BLOOD PRESSURE: 79 MMHG | SYSTOLIC BLOOD PRESSURE: 119 MMHG | OXYGEN SATURATION: 100 % | TEMPERATURE: 98.7 F | RESPIRATION RATE: 17 BRPM

## 2018-06-04 VITALS — HEART RATE: 66 BPM

## 2018-06-04 VITALS — HEART RATE: 80 BPM

## 2018-06-04 VITALS — HEART RATE: 62 BPM

## 2018-06-04 LAB
% SATURATION IRON PROFILE: 24.5 % (ref 20–50)
ALBUMIN SERPL-MCNC: 3.7 GM/DL (ref 3.4–5)
ALP SERPL-CCNC: 56 U/L (ref 45–117)
ALT SERPL-CCNC: 35 U/L (ref 10–53)
AST SERPL-CCNC: 17 U/L (ref 15–37)
BASOPHILS # BLD AUTO: 0 TH/MM3 (ref 0–0.2)
BASOPHILS NFR BLD: 0.4 % (ref 0–2)
BILIRUB SERPL-MCNC: 0.9 MG/DL (ref 0.2–1)
BUN SERPL-MCNC: 13 MG/DL (ref 7–18)
CALCIUM SERPL-MCNC: 8.4 MG/DL (ref 8.5–10.1)
CHLORIDE SERPL-SCNC: 108 MEQ/L (ref 98–107)
CREAT SERPL-MCNC: 0.79 MG/DL (ref 0.5–1)
EOSINOPHIL # BLD: 0.1 TH/MM3 (ref 0–0.4)
EOSINOPHIL NFR BLD: 2.2 % (ref 0–4)
ERYTHROCYTE [DISTWIDTH] IN BLOOD BY AUTOMATED COUNT: 15.5 % (ref 11.6–17.2)
FERRITIN SERPL-MCNC: 44 NG/ML (ref 8–252)
GFR SERPLBLD BASED ON 1.73 SQ M-ARVRAT: 76 ML/MIN (ref 89–?)
GLUCOSE SERPL-MCNC: 91 MG/DL (ref 74–106)
HCO3 BLD-SCNC: 24.6 MEQ/L (ref 21–32)
HCT VFR BLD CALC: 35 % (ref 35–46)
HGB BLD-MCNC: 11.1 GM/DL (ref 11.6–15.3)
IRON (FE): 79 MCG/DL (ref 50–170)
LYMPHOCYTES # BLD AUTO: 1.6 TH/MM3 (ref 1–4.8)
LYMPHOCYTES NFR BLD AUTO: 23.9 % (ref 9–44)
MAGNESIUM SERPL-MCNC: 2.1 MG/DL (ref 1.5–2.5)
MCH RBC QN AUTO: 20.2 PG (ref 27–34)
MCHC RBC AUTO-ENTMCNC: 31.7 % (ref 32–36)
MCV RBC AUTO: 63.8 FL (ref 80–100)
MONOCYTE #: 0.4 TH/MM3 (ref 0–0.9)
MONOCYTES NFR BLD: 5.5 % (ref 0–8)
NEUTROPHILS # BLD AUTO: 4.5 TH/MM3 (ref 1.8–7.7)
NEUTROPHILS NFR BLD AUTO: 68 % (ref 16–70)
PHOSPHATE SERPL-MCNC: 3.4 MG/DL (ref 2.5–4.9)
PLATELET # BLD: 202 TH/MM3 (ref 150–450)
PMV BLD AUTO: 8.9 FL (ref 7–11)
PROT SERPL-MCNC: 7.6 GM/DL (ref 6.4–8.2)
RBC # BLD AUTO: 5.49 MIL/MM3 (ref 4–5.3)
RETIC #: 57.8 MIL/L (ref 20–150)
RETICS/RBC NFR: 1.1 % (ref 0.4–3)
SODIUM SERPL-SCNC: 141 MEQ/L (ref 136–145)
TIBC SERPL-MCNC: 322 MCG/DL (ref 250–450)
WBC # BLD AUTO: 6.7 TH/MM3 (ref 4–11)

## 2018-06-04 RX ADMIN — Medication SCH ML: at 20:46

## 2018-06-04 RX ADMIN — LEVETIRACETAM SCH MLS/HR: 10 INJECTION INTRAVENOUS at 20:46

## 2018-06-04 RX ADMIN — STANDARDIZED SENNA CONCENTRATE AND DOCUSATE SODIUM SCH TAB: 8.6; 5 TABLET, FILM COATED ORAL at 08:57

## 2018-06-04 RX ADMIN — STANDARDIZED SENNA CONCENTRATE AND DOCUSATE SODIUM SCH TAB: 8.6; 5 TABLET, FILM COATED ORAL at 21:00

## 2018-06-04 RX ADMIN — CHLORHEXIDINE GLUCONATE SCH PACK: 500 CLOTH TOPICAL at 04:00

## 2018-06-04 RX ADMIN — LEVETIRACETAM SCH MLS/HR: 10 INJECTION INTRAVENOUS at 08:57

## 2018-06-04 RX ADMIN — ATORVASTATIN CALCIUM SCH MG: 40 TABLET, FILM COATED ORAL at 20:46

## 2018-06-04 RX ADMIN — Medication SCH ML: at 08:57

## 2018-06-04 RX ADMIN — LISINOPRIL SCH MG: 10 TABLET ORAL at 08:57

## 2018-06-04 RX ADMIN — STANDARDIZED SENNA CONCENTRATE AND DOCUSATE SODIUM SCH TAB: 8.6; 5 TABLET, FILM COATED ORAL at 20:46

## 2018-06-04 RX ADMIN — PANTOPRAZOLE SCH MG: 40 TABLET, DELAYED RELEASE ORAL at 08:57

## 2018-06-04 NOTE — HHI.PR
Subjective


Remarks


"I am doing very well "


Patient sitting on the chair she is telling me that she is doing well


She does not want to be transferred to [facility] floor





Objective


Vitals





Vital Signs








  Date Time  Temp Pulse Resp B/P (MAP) Pulse Ox O2 Delivery O2 Flow Rate FiO2


 


6/4/18 06:00  68      


 


6/4/18 04:00 98.0 66 17 134/61 (85) 98   


 


6/4/18 04:00  66      


 


6/4/18 02:00  65      


 


6/4/18 00:00 98.2 71 16 122/73 (89) 98   


 


6/4/18 00:00  71      


 


6/3/18 22:00  66      


 


6/3/18 20:00 98.5 70 18 113/79 (90) 98   


 


6/3/18 20:00  74      


 


6/3/18 19:00     100 Room Air  


 


6/3/18 18:00  72      


 


6/3/18 16:00  69      


 


6/3/18 16:00 98.4 69 27 115/56 (75) 100   


 


6/3/18 14:00  65      














I/O      


 


 6/3/18 6/3/18 6/3/18 6/4/18 6/4/18 6/4/18





 07:00 15:00 23:00 07:00 15:00 23:00


 


Intake Total 1000 ml  1480 ml 420 ml  


 


Output Total 900 ml   0 ml  


 


Balance 100 ml  1480 ml 420 ml  


 


      


 


Intake Oral   880 ml 420 ml  


 


IV Total 1000 ml  600 ml   


 


Output Urine Total 900 ml     


 


Stool Total    0 ml  


 


# Voids   3 2  








Result Diagram:  


6/4/18 0514                                                                    

            6/4/18 0514





Objective Remarks


GENERAL: This is a well-nourished, well-developed patient, in no apparent 

distress.


SKIN: No rashes, warm and dry 


HEAD: Atraumatic. Normocephalic. 


EYES: Pupils equal round and reactive. Extraocular motions intact. No scleral 

icterus. 


ENT: Nose without bleeding, or drainage, Airway patent.


NECK: Trachea midline.  Supple


CARDIOVASCULAR: Regular rate and rhythm without murmurs, gallops, or rubs. 


RESPIRATORY: Fair air entry bilaterally. No wheezes, rales, or rhonchi.  


GASTROINTESTINAL: Abdomen soft, non-tender, nondistended.  Positive bowel sounds


MUSCULOSKELETAL: Extremities without clubbing, cyanosis, or edema.  Pedal 

pulses appreciated


NEUROLOGICAL: Awake and alert oriented 3, Cranial nerves II through XII intact 

moves all extremity.  Normal speech.no focal neurological deficit





A/P


Assessment and Plan


53-year-old female presents with a new onset of left-sided weakness and 

numbness.  She has no other concurrent complaints at this time.  She has had 2 

prior strokes but is not on any blood thinners because she has had prior 

bleeding in the brain.  She denies any modifying factors.  CT head obtained in 

the emergency department showed acute right thalamic bleed with no measurable 

midline shift.








6/3: Afebrile.  No acute issues overnight.  Currently resting in bed in no 

acute distress.  Repeat brain CT this a.m. reveals stable right thalamic 

hemorrhage.


6/4: Stable afebrile, neurosurgery following, continue neuro check, PT OT plan 

for discharge when cleared by neurosurgery











A/P:





Acute right thalamic intracerebral hemorrhage


History of right frontoparietal craniotomy with history of aneurysmal 2 

clipping 1





CT brain 6/3 revealed stable tubes centimeter right thalamic intraparenchymal 

hemorrhage.  Old history of right frontoparietal craniotomy with aneurysmal 

clipping noted.


CTA brain/neck revealed no aneurysms


Evaluated by neurosurgery.  No intervention planned at this time


Levetiracetam 500 mg IV twice daily for 7 total days for seizure prophylaxis


Keep systolic blood pressure less than 140


Neurochecks


 


CV: 





Essential hypertension


Hyperlipidemia





Continue lisinopril 10 mg daily for essential hypertension


As needed labetalol and enalapril and nicardipine drip keep systolic blood 

pressure less than 140


Continue atorvastatin 40 mg daily for dyslipidemia


Discontinue normal saline at 84 cc an hour





Resp: 





Left lung apex pleural thickening





Nasal cannula to maintain saturations greater than or equal to 92%


Incentive spirometry while awake


Albuterol aerosols every 2 hours as needed dyspnea





Recommend follow-up CT thorax in 3 months to follow-up on left apex pleural 

thickening





GI: 





Gastroesophageal reflux disease





Advance diet per neurosurgery


Currently on pantoprazole 40 mg daily.  Switch to p.o.


On ranitidine 150 mg daily at home for GERD


Docusate sodium/senna 1 tablet twice daily for bowel regimen





:  





No indication for Monroy catheter





Endo:  





Sliding scale insulin if indicated to maintain euglycemia





Renal: 





Creatinine currently within normal limits


Monitor urine output


Accurate I's and O's





Heme:





Microcytic anemia





Monitor CBC daily.  Follow trend


Hemoccult stool


Iron studies/transferrin/ferritin/reticulocyte count and peripheral smear 

pending





ID:





Monitor for signs and symptomatology of infection





FEN:





Replace electrolytes as clinically indicated





MSK:





PT evaluate and treat





Access -utilize peripheral IV.  Central line if indicated





Prophylaxis


-GI -pantoprazole


-DVT -SCD/pharmacological prophylaxis when okay with neurosurgery











Magdi Burgess MD Jun 4, 2018 12:41

## 2018-06-04 NOTE — HHI.NSPN
__________________________________________________


 (Darcy Randle)





Note Status


Status:  Progress Note


 (Darcy Randle)





Interval History


Interval History


This is a 53-year-old female patient with previous history of 


craniotomy for aneurysm clipping.  The patient reports that she had 


been doing well, when around 2 o'clock she noted significant weakness 


of the left side.  At that time, she denies having any headaches or 


any nausea or vomiting.  She went to [place] for evaluation.  CT 


scan was performed, which was abnormal and, because of this, the 


patient eventually was transferred.  The patient reports that 


presently she is doing well.  She feels that her weakness on the left 


side is improving.  She denies any numbness at the present time.  She 


denies any headaches or any problems with nausea and vomiting.


 





6/4: feels well, denies headaches, reports left side feels stronger, denies 

nausea, vomiting, seizures. wants to go home


 (Darcy Randle)





Labs, Micro, & Vital Signs


Results











  Date Time  Temp Pulse Resp B/P (MAP) Pulse Ox O2 Delivery O2 Flow Rate FiO2


 


6/4/18 06:00  68      


 


6/4/18 04:00 98.0 66 17 134/61 (85) 98   


 


6/4/18 04:00  66      


 


6/4/18 02:00  65      


 


6/4/18 00:00 98.2 71 16 122/73 (89) 98   


 


6/4/18 00:00  71      


 


6/3/18 22:00  66      


 


6/3/18 20:00 98.5 70 18 113/79 (90) 98   


 


6/3/18 20:00  74      


 


6/3/18 19:00     100 Room Air  


 


6/3/18 18:00  72      


 


6/3/18 16:00  69      


 


6/3/18 16:00 98.4 69 27 115/56 (75) 100   


 


6/3/18 14:00  65      


 


6/3/18 12:00  63      


 


6/3/18 12:00 98.6 63 20 110/63 (79) 97   


 


6/3/18 10:00  59      








Constitutional





Vital Signs








  Date Time  Temp Pulse Resp B/P (MAP) Pulse Ox O2 Delivery O2 Flow Rate FiO2


 


6/4/18 06:00  68      


 


6/4/18 04:00 98.0 66 17 134/61 (85) 98   


 


6/4/18 04:00  66      


 


6/4/18 02:00  65      


 


6/4/18 00:00 98.2 71 16 122/73 (89) 98   


 


6/4/18 00:00  71      


 


6/3/18 22:00  66      


 


6/3/18 20:00 98.5 70 18 113/79 (90) 98   


 


6/3/18 20:00  74      


 


6/3/18 19:00     100 Room Air  


 


6/3/18 18:00  72      


 


6/3/18 16:00  69      


 


6/3/18 16:00 98.4 69 27 115/56 (75) 100   


 


6/3/18 14:00  65      


 


6/3/18 12:00  63      


 


6/3/18 12:00 98.6 63 20 110/63 (79) 97   


 


6/3/18 10:00  59      








 (Darcy Randle)





Review of Systems


Constitutional:  DENIES: Fever, Chills


Eyes:  DENIES: Diplopia, Double Vision


Respiratory:  DENIES: Shortness of breath


Cardiovascular:  DENIES: Chest pain, Palpitations


Neurologic:  COMPLAINS OF: Localized weakness, DENIES: Headache, Seizures, 

Speech Problems


Psychiatric:  DENIES: Confusion (Darcy Randle)





Physical Exam


General: Sitting up in chair in no acute distress





HEENT: Normocephalic.  Nonicteric sclera. No nasal drainage.





Neuro: Alert, awake and oriented to time, place and person. Speech is fluent.  

Follows commands without difficulties.  Cranial nerve: pupils equal, round, and 

reactive to light. Extra-ocular movements are intact. Facial motor are normal 

and symmetrical. Gross hearing is intact, bilaterally. The uvula is midline and 

elevates symmetrically with the soft palate. Sternocleidomastoid and trapezius 

muscles have normal and symmetrical strength. Other cranial nerves are intact. 

There is a bilateral plantar flexion response. 





Neck is soft and supple. 





Musculoskeletal: No obvious deformities to extremities.  Muscle testing reveals 

normal bulk and tone overall without rigidity, spasticity, fasciculations, or 

atrophy. Moves all four extremities well, with mild left side weakness. 





Heart: Regular rate rhythm





Respiratory: clear, nonlabored breathing.  





Skin: warm, dry. No cyanosis or erythema. 


 (Darcy Randle)


General: Sitting up in chair in no acute distress





HEENT: Normocephalic.  Nonicteric sclera. No nasal drainage.





Neuro: Alert, awake and oriented to time, place and person. Speech is fluent.  

Follows commands without difficulties.  Cranial nerve: pupils equal, round, and 

reactive to light. Extra-ocular movements are intact. Facial motor are normal 

and symmetrical. Gross hearing is intact, bilaterally. The uvula is midline and 

elevates symmetrically with the soft palate. Sternocleidomastoid and trapezius 

muscles have normal and symmetrical strength. Other cranial nerves are intact. 

There is a bilateral plantar flexion response. 





Neck is soft and supple. 





Musculoskeletal: No obvious deformities to extremities.  Muscle testing reveals 

normal bulk and tone overall without rigidity, spasticity, fasciculations, or 

atrophy. Moves all four extremities well, with mild left side weakness. 





Heart: Regular rate rhythm





Respiratory: clear, nonlabored breathing.  





Skin: warm, dry. No cyanosis or erythema.


 (Sabino Sargent MD)





Medications


Current Medications





Current Medications








 Medications


  (Trade)  Dose


 Ordered  Sig/Tyson


 Route


 PRN Reason  Start Time


 Stop Time Status Last Admin


Dose Admin


 


 Sodium Chloride


  (NS Flush)  2 ml  UNSCH  PRN


 IV FLUSH


 FLUSH AFTER USING IV ACCESS  6/2/18 16:00


     


 


 


 Sodium Chloride


  (NS Flush)  2 ml  BID


 IV FLUSH


   6/2/18 21:00


    6/4/18 08:57


 


 


 Acetaminophen


  (Tylenol)  650 mg  Q6H  PRN


 PO


 FEVER >101F  6/2/18 16:00


     


 


 


 Acetaminophen/


 Hydrocodone Bitart


  (Norco  5-325 Mg)  1 tab  Q4H  PRN


 PO


 PAIN SCALE 1 TO 5  6/2/18 16:00


     


 


 


 Morphine Sulfate


  (Morphine Inj)  2 mg  Q2H  PRN


 IV PUSH


 PAIN SCALE 6 TO 10  6/2/18 16:00


     


 


 


 Ondansetron HCl


  (Zofran Inj)  4 mg  Q6H  PRN


 IV PUSH


 NAUSEA OR VOMITING  6/2/18 16:00


     


 


 


 Albuterol Sulfate


  (Albuterol Neb)  2.5 mg  Q2HR NEB  PRN


 INH


 SOB/WHEEZING  6/2/18 16:00


     


 


 


 Miscellaneous


 Information


  (Misc Nursing


 Information)  1  Q361D


 XX


   6/2/18 16:00


     


 


 


 Chlorhexidine


 Gluconate


  (Chlorhexidine


 2% Cloth)  3 pack


 Taper  DAILY@04


 TOP


   6/3/18 04:00


 5/30/19 03:59  6/4/18 04:00


 


 


 Chlorhexidine


 Gluconate


  (Chlorhexidine


 2% Cloth)  3 pack  UNSCH  PRN


 TOP


 HYGIENIC CARE  6/2/18 16:00


     


 


 


 Senna/Docusate


 Sodium


  (Jaci-Colace)  1 tab  BID


 PO


   6/2/18 21:00


    6/4/18 08:57


 


 


 Magnesium


 Hydroxide


  (Milk Of


 Magnesia Liq)  30 ml  Q12H  PRN


 PO


 Mild constipation  6/2/18 16:00


     


 


 


 Sennosides


  (Senokot)  17.2 mg  Q12H  PRN


 PO


 Moderate constipation  6/2/18 16:00


     


 


 


 Bisacodyl


  (Dulcolax Supp)  10 mg  DAILY  PRN


 RECTAL


 SEVERE CONSITIPATION  6/2/18 16:00


     


 


 


 Lactulose


  (Lactulose Liq)  30 ml  DAILY  PRN


 PO


 SEVERE CONSITIPATION  6/2/18 16:00


     


 


 


 Labetalol HCl


  (Trandate Inj)  5 mg  Q1HR  PRN


 IV PUSH


 SBP>150, DBP>90, HR>65  6/2/18 16:00


     


 


 


 Nicardipine HCl


 25 mg/Sodium


 Chloride  260 ml @ 


 52 mls/hr  TITRATE  PRN


 IV


 Blood pressure management  6/2/18 16:00


     


 


 


 Atorvastatin


 Calcium


  (Lipitor)  40 mg  HS


 PO


   6/2/18 21:00


    6/3/18 21:06


 


 


 Lisinopril


  (Prinivil)  10 mg  DAILY


 PO


   6/3/18 09:00


    6/4/18 08:57


 


 


 Enalaprilat


  (Vasotec Inj)  2.5 mg  Q6H  PRN


 IV PUSH


 SBP>150, DBP>90  6/2/18 16:00


    6/2/18 20:10


 


 


 Levetriacetam  100 ml @ 


 400 mls/hr  Q12HR


 IV


   6/2/18 21:00


 6/9/18 20:59  6/4/18 08:57


 


 


 Pantoprazole


 Sodium


  (Protonix)  40 mg  DAILY


 PO


   6/3/18 09:00


    6/4/18 08:57


 








 (Darcy Randle)


Current Medications





Current Medications


Iodixanol (VISIPAQUE 320 INJ (Rad CT)) 97 ml STK-MED ONCE IVCONTRAST  Last 

administered on 6/2/18at 15:30;  Start 6/2/18 at 15:30;  Stop 6/2/18 at 15:33;  

Status DC


Sodium Chloride 1,000 ml @  84 mls/hr [id number] IV  Last administered on 6/3/18at 

04:38;  Start 6/2/18 at 15:54;  Stop 6/3/18 at 06:38;  Status DC


Sodium Chloride (NS Flush) 2 ml UNSCH  PRN IV FLUSH FLUSH AFTER USING IV ACCESS

;  Start 6/2/18 at 16:00


Sodium Chloride (NS Flush) 2 ml BID IV FLUSH  Last administered on 6/5/18at 09:

06;  Start 6/2/18 at 21:00


Acetaminophen (Tylenol) 650 mg Q6H  PRN PO FEVER >101F;  Start 6/2/18 at 16:00


Acetaminophen/ Hydrocodone Bitart (Norco  5-325 Mg) 1 tab Q4H  PRN PO PAIN 

SCALE 1 TO 5 Last administered on 6/5/18at 09:06;  Start 6/2/18 at 16:00


Morphine Sulfate (Morphine Inj) 2 mg Q2H  PRN IV PUSH PAIN SCALE 6 TO 10;  

Start 6/2/18 at 16:00


Pantoprazole Sodium (Protonix Inj) 40 mg DAILY IV PUSH ;  Start 6/3/18 at 09:00

;  Stop 6/3/18 at 09:00;  Status DC


Ondansetron HCl (Zofran Inj) 4 mg Q6H  PRN IV PUSH NAUSEA OR VOMITING;  Start 6/ 2/18 at 16:00


Albuterol Sulfate (Albuterol Neb) 2.5 mg Q2HR NEB  PRN INH SOB/WHEEZING;  Start 

6/2/18 at 16:00


Miscellaneous Information (Misc Nursing Information) 1 Q361D XX ;  Start 6/2/18 

at 16:00


Chlorhexidine Gluconate (Chlorhexidine 2% Cloth) 3 pack Taper DAILY@04 TOP  

Last administered on 6/5/18at 03:35;  Start 6/3/18 at 04:00;  Stop 5/30/19 at 03

:59


Chlorhexidine Gluconate (Chlorhexidine 2% Cloth) 3 pack UNSCH  PRN TOP HYGIENIC 

CARE;  Start 6/2/18 at 16:00


Senna/Docusate Sodium (Jaci-Colace) 1 tab BID PO  Last administered on 6/4/18at 

08:57;  Start 6/2/18 at 21:00


Magnesium Hydroxide (Milk Of Magnesia Liq) 30 ml Q12H  PRN PO Mild constipation

;  Start 6/2/18 at 16:00


Sennosides (Senokot) 17.2 mg Q12H  PRN PO Moderate constipation;  Start 6/2/18 

at 16:00


Bisacodyl (Dulcolax Supp) 10 mg DAILY  PRN RECTAL SEVERE CONSITIPATION;  Start 6 /2/18 at 16:00


Lactulose (Lactulose Liq) 30 ml DAILY  PRN PO SEVERE CONSITIPATION;  Start 6/2/ 18 at 16:00


Labetalol HCl (Trandate Inj) 5 mg Q1HR  PRN IV PUSH SBP>150, DBP>90, HR>65;  

Start 6/2/18 at 16:00


Nicardipine HCl 25 mg/Sodium Chloride 260 ml @  52 mls/hr TITRATE  PRN IV Blood 

pressure management;  Start 6/2/18 at 16:00


Atorvastatin Calcium (Lipitor) 40 mg HS PO  Last administered on 6/4/18at 20:46

;  Start 6/2/18 at 21:00


Lisinopril (Prinivil) 10 mg DAILY PO  Last administered on 6/5/18at 09:05;  

Start 6/3/18 at 09:00


Enalaprilat (Vasotec Inj) 2.5 mg Q6H  PRN IV PUSH SBP>150, DBP>90 Last 

administered on 6/2/18at 20:10;  Start 6/2/18 at 16:00


Levetriacetam 100 ml @  400 mls/hr Q12HR IV  Last administered on 6/5/18at 09:06

;  Start 6/2/18 at 21:00;  Stop 6/9/18 at 20:59


Pantoprazole Sodium (Protonix) 40 mg DAILY PO  Last administered on 6/5/18at 09:

06;  Start 6/3/18 at 09:00


 (Sabino Sargent MD)





Medical Decision Making


MDM Remarks


53-year-old female right thalamic hemorrhage, stable on follow up CT Brain


left side weakness improving


 (Darcy Randle)





Plan


Plan Remarks


cont nonsurgical management,


cont neuro checks


cont blood pressure control


seizure prophylaxis


Protonix for gi prophylaxis


nonchemical dvt prophylaxis in view of acute ICH


PT, OT


 (Darcy Randle)





Attending Statement


As above





Continue  neuro checks. 





Continue strict blood pressure control





Pulmonary. Continue aggressive pulmonary toilette, nasotracheal suction, and 

breathing treatments with nebulizers.





Renal. monitor closely urine output, BUN and creatinine





Endocrine. Monitor serial Acu checks and SSI as needed in detail





ID monitor for signs of infection





Protonix for stress ulcer prophylaxis





Ezra hose and SCD's for DVT prophylaxis. 





The exam, history, and the medical decision-making described in the above note 

were completed with the assistance of the mid-level provider. I reviewed and 

agree with the findings presented.  I attest that I had a face-to-face 

encounter with the patient on the same day, and personally performed and 

documented my assessment and findings in the medical record.


 (Sabino Sargent MD)











Darcy Randle Jun 4, 2018 10:00


Sabino Sargent MD Jun 5, 2018 14:38

## 2018-06-05 VITALS
RESPIRATION RATE: 15 BRPM | HEART RATE: 60 BPM | OXYGEN SATURATION: 99 % | SYSTOLIC BLOOD PRESSURE: 134 MMHG | TEMPERATURE: 98.7 F | DIASTOLIC BLOOD PRESSURE: 63 MMHG

## 2018-06-05 VITALS
RESPIRATION RATE: 15 BRPM | SYSTOLIC BLOOD PRESSURE: 99 MMHG | TEMPERATURE: 98.7 F | OXYGEN SATURATION: 99 % | DIASTOLIC BLOOD PRESSURE: 56 MMHG | HEART RATE: 68 BPM

## 2018-06-05 VITALS — HEART RATE: 68 BPM

## 2018-06-05 VITALS — OXYGEN SATURATION: 95 %

## 2018-06-05 VITALS
SYSTOLIC BLOOD PRESSURE: 127 MMHG | HEART RATE: 74 BPM | RESPIRATION RATE: 15 BRPM | OXYGEN SATURATION: 99 % | DIASTOLIC BLOOD PRESSURE: 8 MMHG | TEMPERATURE: 98.3 F

## 2018-06-05 VITALS
OXYGEN SATURATION: 99 % | DIASTOLIC BLOOD PRESSURE: 78 MMHG | SYSTOLIC BLOOD PRESSURE: 136 MMHG | HEART RATE: 74 BPM | TEMPERATURE: 98.4 F | RESPIRATION RATE: 24 BRPM

## 2018-06-05 VITALS
SYSTOLIC BLOOD PRESSURE: 125 MMHG | HEART RATE: 68 BPM | DIASTOLIC BLOOD PRESSURE: 79 MMHG | RESPIRATION RATE: 20 BRPM | OXYGEN SATURATION: 99 %

## 2018-06-05 VITALS
SYSTOLIC BLOOD PRESSURE: 147 MMHG | TEMPERATURE: 98.6 F | DIASTOLIC BLOOD PRESSURE: 87 MMHG | OXYGEN SATURATION: 98 % | RESPIRATION RATE: 15 BRPM | HEART RATE: 72 BPM

## 2018-06-05 VITALS — HEART RATE: 60 BPM

## 2018-06-05 VITALS — HEART RATE: 58 BPM

## 2018-06-05 RX ADMIN — LISINOPRIL SCH MG: 10 TABLET ORAL at 09:05

## 2018-06-05 RX ADMIN — Medication SCH ML: at 09:06

## 2018-06-05 RX ADMIN — ATORVASTATIN CALCIUM SCH MG: 40 TABLET, FILM COATED ORAL at 20:14

## 2018-06-05 RX ADMIN — PANTOPRAZOLE SCH MG: 40 TABLET, DELAYED RELEASE ORAL at 09:06

## 2018-06-05 RX ADMIN — CHLORHEXIDINE GLUCONATE SCH PACK: 500 CLOTH TOPICAL at 03:35

## 2018-06-05 RX ADMIN — LEVETIRACETAM SCH MLS/HR: 10 INJECTION INTRAVENOUS at 20:14

## 2018-06-05 RX ADMIN — STANDARDIZED SENNA CONCENTRATE AND DOCUSATE SODIUM SCH TAB: 8.6; 5 TABLET, FILM COATED ORAL at 09:00

## 2018-06-05 RX ADMIN — HYDROCODONE BITARTRATE AND ACETAMINOPHEN PRN TAB: 5; 325 TABLET ORAL at 16:48

## 2018-06-05 RX ADMIN — HYDROCODONE BITARTRATE AND ACETAMINOPHEN PRN TAB: 5; 325 TABLET ORAL at 09:06

## 2018-06-05 RX ADMIN — LEVETIRACETAM SCH MLS/HR: 10 INJECTION INTRAVENOUS at 09:06

## 2018-06-05 RX ADMIN — STANDARDIZED SENNA CONCENTRATE AND DOCUSATE SODIUM SCH TAB: 8.6; 5 TABLET, FILM COATED ORAL at 20:15

## 2018-06-05 RX ADMIN — Medication SCH ML: at 20:14

## 2018-06-05 NOTE — HHI.PR
Subjective


Remarks


Afebrile overnight


No acute event


Resting comfortably in bed





Objective


Vitals





Vital Signs








  Date Time  Temp Pulse Resp B/P (MAP) Pulse Ox O2 Delivery O2 Flow Rate FiO2


 


6/5/18 14:00  68      


 


6/5/18 12:00  68      


 


6/5/18 12:00 98.3 74 15 127/8 (47) 99   


 


6/5/18 10:00  68      


 


6/5/18 08:00 98.7 60 15 134/63 (86) 99   


 


6/5/18 08:00  68      


 


6/5/18 07:00     100 Room Air  


 


6/5/18 06:00  68      


 


6/5/18 06:00 98.7 60 15 99/56 (70) 99   


 


6/5/18 04:00  60      


 


6/5/18 02:00  58      


 


6/5/18 00:00  74      


 


6/5/18 00:00 98.4 74 24 136/78 (97) 99   


 


6/4/18 22:00  66      


 


6/4/18 20:00  78      


 


6/4/18 20:00 98.7 78 22 127/77 (94) 98   


 


6/4/18 19:00     100 Room Air  


 


6/4/18 18:00  62      














I/O      


 


 6/4/18 6/4/18 6/4/18 6/5/18 6/5/18 6/5/18





 07:00 15:00 23:00 07:00 15:00 23:00


 


Intake Total 420 ml  760 ml 120 ml  


 


Output Total 0 ml  1 ml 0 ml  


 


Balance 420 ml  759 ml 120 ml  


 


      


 


Intake Oral 420 ml  660 ml 120 ml  


 


IV Total   100 ml   


 


Stool Total 0 ml  1 ml 0 ml  


 


# Voids 2  3 3  








Result Diagram:  


6/4/18 0514                                                                    

            6/4/18 0514





Objective Remarks


GENERAL: This is a well-nourished, well-developed patient, in no apparent 

distress.


SKIN: No rashes, warm and dry 


HEAD: Atraumatic. Normocephalic. 


EYES: Pupils equal round and reactive. Extraocular motions intact. No scleral 

icterus. 


ENT: Nose without bleeding, or drainage, Airway patent.


NECK: Trachea midline.  Supple


CARDIOVASCULAR: Regular rate and rhythm without murmurs, gallops, or rubs. 


RESPIRATORY: Fair air entry bilaterally. No wheezes, rales, or rhonchi.  


GASTROINTESTINAL: Abdomen soft, non-tender, nondistended.  Positive bowel sounds


MUSCULOSKELETAL: Extremities without clubbing, cyanosis, or edema.  Pedal 

pulses appreciated


NEUROLOGICAL: Awake and alert oriented 3, Cranial nerves II through XII intact 

moves all extremity.  Normal speech.no focal neurological deficit





A/P


Assessment and Plan


53-year-old female presents with a new onset of left-sided weakness and 

numbness.  She has no other concurrent complaints at this time.  She has had 2 

prior strokes but is not on any blood thinners because she has had prior 

bleeding in the brain.  She denies any modifying factors.  CT head obtained in 

the emergency department showed acute right thalamic bleed with no measurable 

midline shift.








6/3: Afebrile.  No acute issues overnight.  Currently resting in bed in no 

acute distress.  Repeat brain CT this a.m. reveals stable right thalamic 

hemorrhage.


6/4: Stable afebrile, neurosurgery following, continue neuro check, PT OT plan 

for discharge when cleared by neurosurgery


6/5: Stable, continue monitoring neuro check, neurosurgery following, continue 

PT OT








A/P:





Acute right thalamic intracerebral hemorrhage


History of right frontoparietal craniotomy with history of aneurysmal 2 

clipping 1





CT brain 6/3 revealed stable tubes centimeter right thalamic intraparenchymal 

hemorrhage.  Old history of right frontoparietal craniotomy with aneurysmal 

clipping noted.


CTA brain/neck revealed no aneurysms


Evaluated by neurosurgery.  No intervention planned at this time


Levetiracetam 500 mg IV twice daily for 7 total days for seizure prophylaxis


Keep systolic blood pressure less than 140


Neurochecks


 


CV: 





Essential hypertension


Hyperlipidemia





Continue lisinopril 10 mg daily for essential hypertension


As needed labetalol and enalapril and nicardipine drip keep systolic blood 

pressure less than 140


Continue atorvastatin 40 mg daily for dyslipidemia


Discontinue normal saline at 84 cc an hour





Resp: 





Left lung apex pleural thickening





Nasal cannula to maintain saturations greater than or equal to 92%


Incentive spirometry while awake


Albuterol aerosols every 2 hours as needed dyspnea





Recommend follow-up CT thorax in 3 months to follow-up on left apex pleural 

thickening





GI: 





Gastroesophageal reflux disease





Advance diet per neurosurgery


Currently on pantoprazole 40 mg daily.  Switch to p.o.


On ranitidine 150 mg daily at home for GERD


Docusate sodium/senna 1 tablet twice daily for bowel regimen





:  





No indication for Monroy catheter





Endo:  





Sliding scale insulin if indicated to maintain euglycemia





Renal: 





Creatinine currently within normal limits


Monitor urine output


Accurate I's and O's





Heme:





Microcytic anemia





Monitor CBC daily.  Follow trend


Hemoccult stool


Iron studies/transferrin/ferritin/reticulocyte count and peripheral smear 

pending





ID:





Monitor for signs and symptomatology of infection





FEN:





Replace electrolytes as clinically indicated





MSK:





PT evaluate and treat





Access -utilize peripheral IV.  Central line if indicated





Prophylaxis


-GI -pantoprazole


-DVT -SCD/pharmacological prophylaxis when okay with neurosurgery











Magdi Burgess MD Jun 5, 2018 17:11

## 2018-06-05 NOTE — HHI.NSPN
__________________________________________________





Note Status


Status:  Progress Note





Interval History


Interval History


This is a 53-year-old female patient with previous history of 


craniotomy for aneurysm clipping.  The patient reports that she had 


been doing well, when around 2 o'clock she noted significant weakness 


of the left side.  At that time, she denies having any headaches or 


any nausea or vomiting.  She went to [facility] for evaluation.  CT 


scan was performed, which was abnormal and, because of this, the 


patient eventually was transferred.  The patient reports that 


presently she is doing well.  She feels that her weakness on the left 


side is improving.  She denies any numbness at the present time.  She 


denies any headaches or any problems with nausea and vomiting.


 





6/4: feels well, denies headaches, reports left side feels stronger, denies 

nausea, vomiting, seizures. wants to go home


6/5: reports left side weakness is much better, denies headaches, vomiting, no 

new neurological complaints.





Labs, Micro, & Vital Signs


Results











  Date Time  Temp Pulse Resp B/P (MAP) Pulse Ox O2 Delivery O2 Flow Rate FiO2


 


6/5/18 06:00  68      


 


6/5/18 06:00 98.7 60 15 99/56 (70) 99   


 


6/5/18 04:00  60      


 


6/5/18 02:00  58      


 


6/5/18 00:00  74      


 


6/5/18 00:00 98.4 74 24 136/78 (97) 99   


 


6/4/18 22:00  66      


 


6/4/18 20:00  78      


 


6/4/18 20:00 98.7 78 22 127/77 (94) 98   


 


6/4/18 19:00     100 Room Air  


 


6/4/18 18:00  62      


 


6/4/18 16:00  65      


 


6/4/18 16:00 98.0 66 27 120/74 (89) 99   


 


6/4/18 14:00  65      








Constitutional





Vital Signs








  Date Time  Temp Pulse Resp B/P (MAP) Pulse Ox O2 Delivery O2 Flow Rate FiO2


 


6/5/18 06:00  68      


 


6/5/18 06:00 98.7 60 15 99/56 (70) 99   


 


6/5/18 04:00  60      


 


6/5/18 02:00  58      


 


6/5/18 00:00  74      


 


6/5/18 00:00 98.4 74 24 136/78 (97) 99   


 


6/4/18 22:00  66      


 


6/4/18 20:00  78      


 


6/4/18 20:00 98.7 78 22 127/77 (94) 98   


 


6/4/18 19:00     100 Room Air  


 


6/4/18 18:00  62      


 


6/4/18 16:00  65      


 


6/4/18 16:00 98.0 66 27 120/74 (89) 99   


 


6/4/18 14:00  65      











Review of Systems


Constitutional:  DENIES: Fever, Chills


Respiratory:  DENIES: Shortness of breath


Cardiovascular:  DENIES: Chest pain


Neurologic:  DENIES: Headache, Localized weakness (reports improved)





Physical Exam


General: Sitting up in chair in no acute distress





HEENT: Normocephalic.  Nonicteric sclera. No nasal drainage.





Neuro: Alert, awake and oriented to time, place and person. Speech is fluent.  

Follows commands without difficulties.  Cranial nerve: pupils equal, round, and 

reactive to light. Extra-ocular movements are intact. Facial motor are normal 

and symmetrical. Gross hearing is intact, bilaterally. The uvula is midline and 

elevates symmetrically with the soft palate. Sternocleidomastoid and trapezius 

muscles have normal and symmetrical strength. Other cranial nerves are intact. 

There is a bilateral plantar flexion response. 





Neck is soft and supple. 





Musculoskeletal: No obvious deformities to extremities.  Muscle testing reveals 

normal bulk and tone overall without rigidity, spasticity, fasciculations, or 

atrophy. Moves all four extremities well symmetrically. 





Heart: Regular rate rhythm





Respiratory: clear, nonlabored breathing.  





Skin: warm, dry. No cyanosis or erythema.





Medications


Current Medications





Current Medications








 Medications


  (Trade)  Dose


 Ordered  Sig/Tyson


 Route


 PRN Reason  Start Time


 Stop Time Status Last Admin


Dose Admin


 


 Sodium Chloride


  (NS Flush)  2 ml  UNSCH  PRN


 IV FLUSH


 FLUSH AFTER USING IV ACCESS  6/2/18 16:00


     


 


 


 Sodium Chloride


  (NS Flush)  2 ml  BID


 IV FLUSH


   6/2/18 21:00


    6/5/18 09:06


 


 


 Acetaminophen


  (Tylenol)  650 mg  Q6H  PRN


 PO


 FEVER >101F  6/2/18 16:00


     


 


 


 Acetaminophen/


 Hydrocodone Bitart


  (Norco  5-325 Mg)  1 tab  Q4H  PRN


 PO


 PAIN SCALE 1 TO 5  6/2/18 16:00


    6/5/18 09:06


 


 


 Morphine Sulfate


  (Morphine Inj)  2 mg  Q2H  PRN


 IV PUSH


 PAIN SCALE 6 TO 10  6/2/18 16:00


     


 


 


 Ondansetron HCl


  (Zofran Inj)  4 mg  Q6H  PRN


 IV PUSH


 NAUSEA OR VOMITING  6/2/18 16:00


     


 


 


 Albuterol Sulfate


  (Albuterol Neb)  2.5 mg  Q2HR NEB  PRN


 INH


 SOB/WHEEZING  6/2/18 16:00


     


 


 


 Miscellaneous


 Information


  (Misc Nursing


 Information)  1  Q361D


 XX


   6/2/18 16:00


     


 


 


 Chlorhexidine


 Gluconate


  (Chlorhexidine


 2% Cloth)  3 pack


 Taper  DAILY@04


 TOP


   6/3/18 04:00


 5/30/19 03:59  6/5/18 03:35


 


 


 Chlorhexidine


 Gluconate


  (Chlorhexidine


 2% Cloth)  3 pack  UNSCH  PRN


 TOP


 HYGIENIC CARE  6/2/18 16:00


     


 


 


 Senna/Docusate


 Sodium


  (Jaci-Colace)  1 tab  BID


 PO


   6/2/18 21:00


    6/4/18 08:57


 


 


 Magnesium


 Hydroxide


  (Milk Of


 Magnesia Liq)  30 ml  Q12H  PRN


 PO


 Mild constipation  6/2/18 16:00


     


 


 


 Sennosides


  (Senokot)  17.2 mg  Q12H  PRN


 PO


 Moderate constipation  6/2/18 16:00


     


 


 


 Bisacodyl


  (Dulcolax Supp)  10 mg  DAILY  PRN


 RECTAL


 SEVERE CONSITIPATION  6/2/18 16:00


     


 


 


 Lactulose


  (Lactulose Liq)  30 ml  DAILY  PRN


 PO


 SEVERE CONSITIPATION  6/2/18 16:00


     


 


 


 Labetalol HCl


  (Trandate Inj)  5 mg  Q1HR  PRN


 IV PUSH


 SBP>150, DBP>90, HR>65  6/2/18 16:00


     


 


 


 Nicardipine HCl


 25 mg/Sodium


 Chloride  260 ml @ 


 52 mls/hr  TITRATE  PRN


 IV


 Blood pressure management  6/2/18 16:00


     


 


 


 Atorvastatin


 Calcium


  (Lipitor)  40 mg  HS


 PO


   6/2/18 21:00


    6/4/18 20:46


 


 


 Lisinopril


  (Prinivil)  10 mg  DAILY


 PO


   6/3/18 09:00


    6/5/18 09:05


 


 


 Enalaprilat


  (Vasotec Inj)  2.5 mg  Q6H  PRN


 IV PUSH


 SBP>150, DBP>90  6/2/18 16:00


    6/2/18 20:10


 


 


 Levetriacetam  100 ml @ 


 400 mls/hr  Q12HR


 IV


   6/2/18 21:00


 6/9/18 20:59  6/5/18 09:06


 


 


 Pantoprazole


 Sodium


  (Protonix)  40 mg  DAILY


 PO


   6/3/18 09:00


    6/5/18 09:06


 











Medical Decision Making


MDM Remarks


53-year-old female right thalamic hemorrhage, stable on follow up CT Brain


left side weakness improving





Plan


Plan Remarks


cont nonsurgical management,


cont neuro checks


cont blood pressure control, defer to medical physician


seizure prophylaxis


Protonix for gi prophylaxis


nonchemical dvt prophylaxis in view of acute ICH


PT, OT


clear to transfer out of ICU











Darcy Randle Jun 5, 2018 13:46

## 2018-06-06 VITALS
RESPIRATION RATE: 14 BRPM | TEMPERATURE: 98.1 F | HEART RATE: 56 BPM | DIASTOLIC BLOOD PRESSURE: 77 MMHG | SYSTOLIC BLOOD PRESSURE: 130 MMHG | OXYGEN SATURATION: 100 %

## 2018-06-06 VITALS
RESPIRATION RATE: 16 BRPM | OXYGEN SATURATION: 100 % | DIASTOLIC BLOOD PRESSURE: 75 MMHG | SYSTOLIC BLOOD PRESSURE: 119 MMHG | TEMPERATURE: 98.6 F | HEART RATE: 54 BPM

## 2018-06-06 VITALS
OXYGEN SATURATION: 100 % | RESPIRATION RATE: 15 BRPM | SYSTOLIC BLOOD PRESSURE: 147 MMHG | TEMPERATURE: 98.3 F | HEART RATE: 56 BPM | DIASTOLIC BLOOD PRESSURE: 70 MMHG

## 2018-06-06 VITALS — OXYGEN SATURATION: 100 %

## 2018-06-06 RX ADMIN — PANTOPRAZOLE SCH MG: 40 TABLET, DELAYED RELEASE ORAL at 07:50

## 2018-06-06 RX ADMIN — LISINOPRIL SCH MG: 10 TABLET ORAL at 07:50

## 2018-06-06 RX ADMIN — STANDARDIZED SENNA CONCENTRATE AND DOCUSATE SODIUM SCH TAB: 8.6; 5 TABLET, FILM COATED ORAL at 07:50

## 2018-06-06 RX ADMIN — CHLORHEXIDINE GLUCONATE SCH PACK: 500 CLOTH TOPICAL at 03:34

## 2018-06-06 RX ADMIN — Medication SCH ML: at 07:50

## 2018-06-06 RX ADMIN — LEVETIRACETAM SCH MLS/HR: 10 INJECTION INTRAVENOUS at 07:50

## 2018-06-06 NOTE — HHI.DS
__________________________________________________





Discharge Summary


Admission Date


Jun 2, 2018 at 15:47


Discharge Date:  Jun 6, 2018


Admitting Diagnosis





Intracranial bleed





(1) Thalamic hemorrhage


ICD Code:  I61.0 - Nontraumatic intracerebral hemorrhage in hemisphere, 

subcortical


Status:  Acute


Procedures


see below


Brief History - From Admission


53-year-old female presents with a new onset of left-sided weakness and 

numbness.  She has no other concurrent complaints at this time.  She has had 2 

prior strokes but is not on any blood thinners because she has had prior 

bleeding in the brain.  She denies any modifying factors.  CT head obtained in 

the emergency department showed acute right thalamic bleed with no measurable 

midline shift.


CBC/BMP:  


6/4/18 0514                                                                    

            6/4/18 0514





Significant Findings





Laboratory Tests








Test


  6/4/18


05:14


 


Red Blood Count


  5.49 MIL/MM3


(4.00-5.30)


 


Hemoglobin


  11.1 GM/DL


(11.6-15.3)


 


Mean Corpuscular Volume


  63.8 FL


(80.0-100.0)


 


Mean Corpuscular Hemoglobin


  20.2 PG


(27.0-34.0)


 


Mean Corpuscular Hemoglobin


Concent 31.7 %


(32.0-36.0)


 


Calcium Level


  8.4 MG/DL


(8.5-10.1)


 


Chloride Level


  108 MEQ/L


()


 


Estimat Glomerular Filtration


Rate 76 ML/MIN


(>89)








PE at Discharge


GENERAL: This is a well-nourished, well-developed patient, in no apparent 

distress.


SKIN: No rashes, warm and dry 


HEAD: Atraumatic. Normocephalic. 


EYES: Pupils equal round and reactive. Extraocular motions intact. No scleral 

icterus. 


ENT: Nose without bleeding, or drainage, Airway patent.


NECK: Trachea midline.  Supple


CARDIOVASCULAR: Regular rate and rhythm without murmurs, gallops, or rubs. 


RESPIRATORY: Fair air entry bilaterally. No wheezes, rales, or rhonchi.  


GASTROINTESTINAL: Abdomen soft, non-tender, nondistended.  Positive bowel sounds


MUSCULOSKELETAL: Extremities without clubbing, cyanosis, or edema.  Pedal 

pulses appreciated


NEUROLOGICAL: Awake and alert oriented 3, Cranial nerves II through XII intact 

moves all extremity.  Normal speech.no focal neurological deficit


Hospital Course


53 years old female, neurosurgery consultedadmitted with right thalamic 

hemorrhage followed by CT of the brain left-sided weakness improved, patient 

had nonsurgical management guided by the neurosurgeon, maintaining blood 

pressure under control Protonix PT OT.  Patient eventually cleared by 

neurosurgeon for discharge with outpatient physical therapy








Face-to-face encounter performed with the patient on discharge day, as well as 

physical exam, summary of hospitalization course and postdischarge plan has 

been  


D/W the patient.


D/W nurse 


D/W .


Discharge medications reviewed and printed and signed, post discharge follow up 

visit with PCP and other specialist as well as Brief hospital course and 

discharge summary has been placed.


Pt Condition on Discharge:  Fair


Discharge Disposition:  Discharge Home


Discharge Time:  > 30 minutes


Discharge Instructions


DIET: Follow Instructions for:  Heart Healthy Diet


Activities you can perform:  See Additionl Instruction


Other Activity Instructions:  


per PT recs


New Medications:  


Levetiracetam (Levetiracetam) 250 Mg Tab


100 MG PO BID for Control Seizures, #60 TAB 0 Refills





 


Continued Medications:  


Atorvastatin (Lipitor) 40 Mg Tab


40 MG PO HS for Cholesterol Management, #30 TAB 0 Refills





Lisinopril (Lisinopril) 10 Mg Tab


10 MG PO DAILY, #30 TAB 0 Refills





Ranitidine (Zantac) 150 Mg Tab


150 MG PO DAILY for Reduce Stomach Acid, #30 TAB 0 Refills

















Magdi Burgess MD Jun 6, 2018 11:00

## 2018-06-06 NOTE — HHI.NSPN
__________________________________________________





Note Status


Status:  Progress Note





Interval History


Interval History


This is a 53-year-old female patient with previous history of 


craniotomy for aneurysm clipping.  The patient reports that she had 


been doing well, when around 2 o'clock she noted significant weakness 


of the left side.  At that time, she denies having any headaches or 


any nausea or vomiting.  She went to [place] for evaluation.  CT 


scan was performed, which was abnormal and, because of this, the 


patient eventually was transferred.  The patient reports that 


presently she is doing well.  She feels that her weakness on the left 


side is improving.  She denies any numbness at the present time.  She 


denies any headaches or any problems with nausea and vomiting.


 





6/4: feels well, denies headaches, reports left side feels stronger, denies 

nausea, vomiting, seizures. wants to go home


6/5: reports left side weakness is much better, denies headaches, vomiting, no 

new neurological complaints. 


6/6: neuro remains stable overnight, bp controlled.





Labs, Micro, & Vital Signs


Results











  Date Time  Temp Pulse Resp B/P (MAP) Pulse Ox O2 Delivery O2 Flow Rate FiO2


 


6/6/18 08:00  56      


 


6/6/18 08:00 98.1 56 14 130/77 (94) 100   


 


6/6/18 07:00     100 Room Air  


 


6/6/18 04:03     100   


 


6/6/18 04:00  54      


 


6/6/18 04:00 98.6 54 16 119/75 (90) 100   


 


6/6/18 00:00  56      


 


6/6/18 00:00 98.3 56 16 116/70 (85) 100   


 


6/5/18 20:07     95   


 


6/5/18 20:00  72      


 


6/5/18 20:00 98.6 72 15 147/87 (107) 98   


 


6/5/18 19:00     100 Room Air  


 


6/5/18 18:00  68      


 


6/5/18 17:48   20     


 


6/5/18 16:00  68      


 


6/5/18 16:00  68 20 125/79 (94) 99   


 


6/5/18 14:00  68      


 


6/5/18 12:00  68      


 


6/5/18 12:00 98.3 74 15 127/78 (94) 99   


 


6/5/18 10:00  68      








Constitutional





Vital Signs








  Date Time  Temp Pulse Resp B/P (MAP) Pulse Ox O2 Delivery O2 Flow Rate FiO2


 


6/6/18 08:00  56      


 


6/6/18 08:00 98.1 56 14 130/77 (94) 100   


 


6/6/18 07:00     100 Room Air  


 


6/6/18 04:03     100   


 


6/6/18 04:00  54      


 


6/6/18 04:00 98.6 54 16 119/75 (90) 100   


 


6/6/18 00:00  56      


 


6/6/18 00:00 98.3 56 16 116/70 (85) 100   


 


6/5/18 20:07     95   


 


6/5/18 20:00  72      


 


6/5/18 20:00 98.6 72 15 147/87 (107) 98   


 


6/5/18 19:00     100 Room Air  


 


6/5/18 18:00  68      


 


6/5/18 17:48   20     


 


6/5/18 16:00  68      


 


6/5/18 16:00  68 20 125/79 (94) 99   


 


6/5/18 14:00  68      


 


6/5/18 12:00  68      


 


6/5/18 12:00 98.3 74 15 127/78 (94) 99   


 


6/5/18 10:00  68      











Review of Systems


Constitutional:  DENIES: Fever, Chills


Respiratory:  DENIES: Apneas, Shortness of breath


Cardiovascular:  DENIES: Chest pain


Gastrointestinal:  DENIES: Abdominal pain


Musculoskeletal:  DENIES: Neck pain


Neurologic:  DENIES: Headache, Localized weakness, Paresthesias, Seizures, 

Speech Problems





Physical Exam


General: well nourished female in no acute distress





HEENT: Normocephalic.  Nonicteric sclera. No nasal drainage.





Neuro: Alert, awake and oriented to time, place and person. Speech is fluent.  

Follows commands without difficulties.  Cranial nerve: pupils equal, round, and 

reactive to light. Extra-ocular movements are intact. Facial motor are normal 

and symmetrical.  Other cranial nerves are intact. There is a bilateral plantar 

flexion response. 





Neck is soft and supple. 





Musculoskeletal: No obvious deformities to extremities.  Moves all four 

extremities symmetrically. 





Heart: Regular rate rhythm





Respiratory: clear, nonlabored breathing.  





Skin: warm, dry. No cyanosis or erythema.





Medications


Current Medications





Current Medications








 Medications


  (Trade)  Dose


 Ordered  Sig/Tyson


 Route


 PRN Reason  Start Time


 Stop Time Status Last Admin


Dose Admin


 


 Sodium Chloride


  (NS Flush)  2 ml  UNSCH  PRN


 IV FLUSH


 FLUSH AFTER USING IV ACCESS  6/2/18 16:00


     


 


 


 Sodium Chloride


  (NS Flush)  2 ml  BID


 IV FLUSH


   6/2/18 21:00


    6/6/18 07:50


 


 


 Acetaminophen


  (Tylenol)  650 mg  Q6H  PRN


 PO


 FEVER >101F  6/2/18 16:00


     


 


 


 Acetaminophen/


 Hydrocodone Bitart


  (Norco  5-325 Mg)  1 tab  Q4H  PRN


 PO


 PAIN SCALE 1 TO 5  6/2/18 16:00


    6/5/18 16:48


 


 


 Morphine Sulfate


  (Morphine Inj)  2 mg  Q2H  PRN


 IV PUSH


 PAIN SCALE 6 TO 10  6/2/18 16:00


     


 


 


 Ondansetron HCl


  (Zofran Inj)  4 mg  Q6H  PRN


 IV PUSH


 NAUSEA OR VOMITING  6/2/18 16:00


     


 


 


 Albuterol Sulfate


  (Albuterol Neb)  2.5 mg  Q2HR NEB  PRN


 INH


 SOB/WHEEZING  6/2/18 16:00


     


 


 


 Miscellaneous


 Information


  (Misc Nursing


 Information)  1  [id number]


 XX


   6/2/18 16:00


     


 


 


 Chlorhexidine


 Gluconate


  (Chlorhexidine


 2% Cloth)  3 pack


 Taper  DAILY@04


 TOP


   6/3/18 04:00


 5/30/19 03:59  6/6/18 03:34


 


 


 Chlorhexidine


 Gluconate


  (Chlorhexidine


 2% Cloth)  3 pack  UNSCH  PRN


 TOP


 HYGIENIC CARE  6/2/18 16:00


     


 


 


 Senna/Docusate


 Sodium


  (Jaci-Colace)  1 tab  BID


 PO


   6/2/18 21:00


    6/6/18 07:50


 


 


 Magnesium


 Hydroxide


  (Milk Of


 Magnesia Liq)  30 ml  Q12H  PRN


 PO


 Mild constipation  6/2/18 16:00


     


 


 


 Sennosides


  (Senokot)  17.2 mg  Q12H  PRN


 PO


 Moderate constipation  6/2/18 16:00


     


 


 


 Bisacodyl


  (Dulcolax Supp)  10 mg  DAILY  PRN


 RECTAL


 SEVERE CONSITIPATION  6/2/18 16:00


     


 


 


 Lactulose


  (Lactulose Liq)  30 ml  DAILY  PRN


 PO


 SEVERE CONSITIPATION  6/2/18 16:00


     


 


 


 Labetalol HCl


  (Trandate Inj)  5 mg  Q1HR  PRN


 IV PUSH


 SBP>150, DBP>90, HR>65  6/2/18 16:00


     


 


 


 Nicardipine HCl


 25 mg/Sodium


 Chloride  260 ml @ 


 52 mls/hr  TITRATE  PRN


 IV


 Blood pressure management  6/2/18 16:00


     


 


 


 Atorvastatin


 Calcium


  (Lipitor)  40 mg  HS


 PO


   6/2/18 21:00


    6/5/18 20:14


 


 


 Lisinopril


  (Prinivil)  10 mg  DAILY


 PO


   6/3/18 09:00


    6/6/18 07:50


 


 


 Enalaprilat


  (Vasotec Inj)  2.5 mg  Q6H  PRN


 IV PUSH


 SBP>150, DBP>90  6/2/18 16:00


    6/2/18 20:10


 


 


 Levetriacetam  100 ml @ 


 400 mls/hr  Q12HR


 IV


   6/2/18 21:00


 6/9/18 20:59  6/6/18 07:50


 


 


 Pantoprazole


 Sodium


  (Protonix)  40 mg  DAILY


 PO


   6/3/18 09:00


    6/6/18 07:50


 











Medical Decision Making


MDM Remarks


53-year-old female right thalamic hemorrhage, stable on follow up CT Brain


left side weakness improving





Plan


Plan Remarks


cont nonsurgical management,


maintain blood pressure control, defer to medical physician


Protonix for gi prophylaxis


nonchemical dvt prophylaxis in view of acute ICH


PT, OT


neuro stable


clear for discharge from [facility] standpoint











Darcy Randle Jun 6, 2018 09:38